# Patient Record
Sex: FEMALE | Race: WHITE | Employment: OTHER | URBAN - METROPOLITAN AREA
[De-identification: names, ages, dates, MRNs, and addresses within clinical notes are randomized per-mention and may not be internally consistent; named-entity substitution may affect disease eponyms.]

---

## 2021-08-19 ENCOUNTER — TRANSFERRED RECORDS (OUTPATIENT)
Dept: PHYSICAL THERAPY | Facility: CLINIC | Age: 71
End: 2021-08-19

## 2021-08-22 DIAGNOSIS — Z11.59 ENCOUNTER FOR SCREENING FOR OTHER VIRAL DISEASES: ICD-10-CM

## 2021-09-17 ENCOUNTER — HOSPITAL ENCOUNTER (OUTPATIENT)
Facility: CLINIC | Age: 71
Discharge: HOME | End: 2021-09-17
Attending: COLON & RECTAL SURGERY | Admitting: COLON & RECTAL SURGERY
Payer: MEDICARE

## 2021-09-17 VITALS
WEIGHT: 144 LBS | HEIGHT: 67 IN | DIASTOLIC BLOOD PRESSURE: 85 MMHG | HEART RATE: 95 BPM | BODY MASS INDEX: 22.6 KG/M2 | RESPIRATION RATE: 15 BRPM | SYSTOLIC BLOOD PRESSURE: 134 MMHG | TEMPERATURE: 97.2 F | OXYGEN SATURATION: 99 %

## 2021-09-17 LAB — COLONOSCOPY: NORMAL

## 2021-09-17 PROCEDURE — 99153 MOD SED SAME PHYS/QHP EA: CPT | Performed by: COLON & RECTAL SURGERY

## 2021-09-17 PROCEDURE — 250N000011 HC RX IP 250 OP 636: Performed by: COLON & RECTAL SURGERY

## 2021-09-17 PROCEDURE — 88305 TISSUE EXAM BY PATHOLOGIST: CPT | Mod: TC | Performed by: COLON & RECTAL SURGERY

## 2021-09-17 PROCEDURE — G0500 MOD SEDAT ENDO SERVICE >5YRS: HCPCS | Performed by: COLON & RECTAL SURGERY

## 2021-09-17 PROCEDURE — 45380 COLONOSCOPY AND BIOPSY: CPT | Mod: PT | Performed by: COLON & RECTAL SURGERY

## 2021-09-17 RX ORDER — ESOMEPRAZOLE MAGNESIUM 40 MG/1
40 CAPSULE, DELAYED RELEASE ORAL DAILY
COMMUNITY
End: 2021-11-16

## 2021-09-17 RX ORDER — BIOTIN 10000 MCG
10 CAPSULE ORAL DAILY
COMMUNITY

## 2021-09-17 RX ORDER — FOLIC ACID 1 MG/1
4 TABLET ORAL DAILY
COMMUNITY

## 2021-09-17 RX ORDER — ONDANSETRON 2 MG/ML
4 INJECTION INTRAMUSCULAR; INTRAVENOUS EVERY 6 HOURS PRN
Status: DISCONTINUED | OUTPATIENT
Start: 2021-09-17 | End: 2021-09-17 | Stop reason: HOSPADM

## 2021-09-17 RX ORDER — LISINOPRIL 5 MG/1
5 TABLET ORAL DAILY
COMMUNITY

## 2021-09-17 RX ORDER — FENTANYL CITRATE 50 UG/ML
INJECTION, SOLUTION INTRAMUSCULAR; INTRAVENOUS PRN
Status: COMPLETED | OUTPATIENT
Start: 2021-09-17 | End: 2021-09-17

## 2021-09-17 RX ORDER — LIDOCAINE 40 MG/G
CREAM TOPICAL
Status: DISCONTINUED | OUTPATIENT
Start: 2021-09-17 | End: 2021-09-17 | Stop reason: HOSPADM

## 2021-09-17 RX ORDER — NALOXONE HYDROCHLORIDE 0.4 MG/ML
0.2 INJECTION, SOLUTION INTRAMUSCULAR; INTRAVENOUS; SUBCUTANEOUS
Status: DISCONTINUED | OUTPATIENT
Start: 2021-09-17 | End: 2021-09-17 | Stop reason: HOSPADM

## 2021-09-17 RX ORDER — NALOXONE HYDROCHLORIDE 0.4 MG/ML
0.4 INJECTION, SOLUTION INTRAMUSCULAR; INTRAVENOUS; SUBCUTANEOUS
Status: DISCONTINUED | OUTPATIENT
Start: 2021-09-17 | End: 2021-09-17 | Stop reason: HOSPADM

## 2021-09-17 RX ORDER — ONDANSETRON 4 MG/1
4 TABLET, ORALLY DISINTEGRATING ORAL EVERY 6 HOURS PRN
Status: DISCONTINUED | OUTPATIENT
Start: 2021-09-17 | End: 2021-09-17 | Stop reason: HOSPADM

## 2021-09-17 RX ORDER — FLUMAZENIL 0.1 MG/ML
0.2 INJECTION, SOLUTION INTRAVENOUS
Status: DISCONTINUED | OUTPATIENT
Start: 2021-09-17 | End: 2021-09-17 | Stop reason: HOSPADM

## 2021-09-17 RX ORDER — LEVOTHYROXINE SODIUM 100 UG/1
100 TABLET ORAL DAILY
COMMUNITY

## 2021-09-17 RX ORDER — METHOTREXATE SODIUM 2.5 MG/1
15 TABLET ORAL WEEKLY
COMMUNITY

## 2021-09-17 RX ORDER — VERAPAMIL HYDROCHLORIDE 180 MG/1
180 TABLET, EXTENDED RELEASE ORAL
COMMUNITY
End: 2021-11-16

## 2021-09-17 RX ORDER — POTASSIUM CITRATE 15 MEQ/1
15 TABLET, EXTENDED RELEASE ORAL 2 TIMES DAILY
COMMUNITY
Start: 2021-07-08

## 2021-09-17 RX ORDER — ONDANSETRON 2 MG/ML
4 INJECTION INTRAMUSCULAR; INTRAVENOUS
Status: DISCONTINUED | OUTPATIENT
Start: 2021-09-17 | End: 2021-09-17 | Stop reason: HOSPADM

## 2021-09-17 RX ORDER — PROCHLORPERAZINE MALEATE 5 MG
5 TABLET ORAL EVERY 6 HOURS PRN
Status: DISCONTINUED | OUTPATIENT
Start: 2021-09-17 | End: 2021-09-17 | Stop reason: HOSPADM

## 2021-09-17 RX ORDER — VENLAFAXINE 37.5 MG/1
37.5 TABLET ORAL DAILY
COMMUNITY

## 2021-09-17 RX ORDER — PREDNISONE 2.5 MG/1
2.5 TABLET ORAL DAILY
COMMUNITY

## 2021-09-17 RX ADMIN — FENTANYL CITRATE 100 MCG: 50 INJECTION, SOLUTION INTRAMUSCULAR; INTRAVENOUS at 13:20

## 2021-09-17 RX ADMIN — FENTANYL CITRATE 50 MCG: 50 INJECTION, SOLUTION INTRAMUSCULAR; INTRAVENOUS at 13:33

## 2021-09-17 RX ADMIN — MIDAZOLAM 1 MG: 1 INJECTION INTRAMUSCULAR; INTRAVENOUS at 13:29

## 2021-09-17 RX ADMIN — MIDAZOLAM 1 MG: 1 INJECTION INTRAMUSCULAR; INTRAVENOUS at 13:21

## 2021-09-17 ASSESSMENT — MIFFLIN-ST. JEOR: SCORE: 1195.81

## 2021-09-17 NOTE — H&P
Pre-Endoscopy History and Physical     Ioana Solomon MRN# [id number]   YOB: 1950 Age: 70 year old     Date of Procedure: 9/17/2021  Primary care provider: Nena Banks  Type of Endoscopy: Colonoscopy  Reason for Procedure: Screening  Type of Anesthesia Anticipated: Moderate Sedation    HPI:    Ioana is a 70 year old female who will be undergoing the above procedure.      A history and physical has been performed. The patient's medications and allergies have been reviewed. The risks and benefits of the procedure and the sedation options and risks were discussed with the patient.  All questions were answered and informed consent was obtained.      She denies a personal or family history of anesthesia complications or bleeding disorders.     Allergies   Allergen Reactions     Levaquin [Levofloxacin]      Plaquenil [Hydroxychloroquine]         No current facility-administered medications on file prior to encounter.  Biotin 10 MG CAPS, Take 10 mg by mouth daily  cholecalciferol (VITAMIN D3) 25 mcg (1000 units) capsule, Take 1 capsule by mouth daily  folic acid (FOLVITE) 1 MG tablet, Take 4 mg by mouth daily  levothyroxine (SYNTHROID/LEVOTHROID) 100 MCG tablet, Take 100 mcg by mouth daily  lisinopril (ZESTRIL) 2.5 MG tablet, Take 2.5 mg by mouth daily  metFORMIN (GLUCOPHAGE) 1000 MG tablet, Take 1,000 mg by mouth 2 times daily (with meals)  methotrexate 2.5 MG tablet, Take 15 mg by mouth once a week  predniSONE (DELTASONE) 2.5 MG tablet, Take 2.5 mg by mouth daily  venlafaxine (EFFEXOR) 25 MG tablet, Take 25 mg by mouth 3 times daily  verapamil ER (CALAN-SR) 180 MG CR tablet, Take 180 mg by mouth 2 times daily        There is no problem list on file for this patient.       History reviewed. No pertinent past medical history.     Past Surgical History:   Procedure Laterality Date     ABDOMEN SURGERY      3 bladder surgeries     APPENDECTOMY       GYN SURGERY      c section x 2     HERNIA  "REPAIR         Social History     Tobacco Use     Smoking status: Never Smoker     Smokeless tobacco: Never Used   Substance Use Topics     Alcohol use: Not Currently       History reviewed. No pertinent family history.    REVIEW OF SYSTEMS:     5 point ROS negative except as noted above in HPI, including Gen., Resp., CV, GI &  system review.      PHYSICAL EXAM:   /65   Pulse 111   Temp 97.2  F (36.2  C) (Skin)   Resp 18   Ht 1.702 m (5' 7\")   Wt 65.3 kg (144 lb)   SpO2 97%   BMI 22.55 kg/m   Estimated body mass index is 22.55 kg/m  as calculated from the following:    Height as of this encounter: 1.702 m (5' 7\").    Weight as of this encounter: 65.3 kg (144 lb).   GENERAL APPEARANCE: healthy and alert  MENTAL STATUS: alert  AIRWAY EXAM: Mallampatti Class I (visualization of the soft palate, fauces, uvula, anterior and posterior pillars)  RESP: lungs clear to auscultation - no rales, rhonchi or wheezes  CV: regular rates and rhythm      IMPRESSION   ASA Class 2 - Mild systemic disease        PLAN:     Plan for colonoscopy. We discussed the risks, benefits and alternatives and the patient wished to proceed.    The above has been forwarded to the consulting provider.      Ceci Ordoñez MD  [facility]  Phone: [phone]  Fax: [phone]  September 17, 2021    "

## 2021-09-20 LAB
PATH REPORT.COMMENTS IMP SPEC: NORMAL
PATH REPORT.COMMENTS IMP SPEC: NORMAL
PATH REPORT.FINAL DX SPEC: NORMAL
PATH REPORT.GROSS SPEC: NORMAL
PATH REPORT.MICROSCOPIC SPEC OTHER STN: NORMAL
PHOTO IMAGE: NORMAL

## 2021-09-20 PROCEDURE — 88305 TISSUE EXAM BY PATHOLOGIST: CPT | Mod: 26 | Performed by: PATHOLOGY

## 2021-11-04 ENCOUNTER — TRANSFERRED RECORDS (OUTPATIENT)
Dept: MULTI SPECIALTY CLINIC | Facility: CLINIC | Age: 71
End: 2021-11-04
Payer: MEDICARE

## 2021-11-04 LAB
CREATININE (EXTERNAL): 0.86 MG/DL (ref 0.5–0.9)
GFR ESTIMATED (EXTERNAL): >60 ML/MIN/1.73M2
GLUCOSE (EXTERNAL): 183 MG/DL (ref 70–100)
POTASSIUM (EXTERNAL): 4.2 MMOL/L (ref 3.5–5.1)

## 2021-11-16 ENCOUNTER — ANESTHESIA EVENT (OUTPATIENT)
Dept: SURGERY | Facility: CLINIC | Age: 71
DRG: 330 | End: 2021-11-16
Payer: MEDICARE

## 2021-11-16 RX ORDER — CHOLECALCIFEROL (VITAMIN D3) 50 MCG
1 TABLET ORAL DAILY
COMMUNITY

## 2021-11-16 RX ORDER — FLUTICASONE PROPIONATE 50 MCG
2 SPRAY, SUSPENSION (ML) NASAL DAILY PRN
COMMUNITY

## 2021-11-16 RX ORDER — GABAPENTIN 300 MG/1
300 CAPSULE ORAL DAILY PRN
COMMUNITY

## 2021-11-16 RX ORDER — VALACYCLOVIR HYDROCHLORIDE 1 G/1
1000 TABLET, FILM COATED ORAL DAILY PRN
COMMUNITY

## 2021-11-16 RX ORDER — IBUPROFEN 800 MG/1
800 TABLET, FILM COATED ORAL 2 TIMES DAILY PRN
COMMUNITY

## 2021-11-16 RX ORDER — VERAPAMIL HYDROCHLORIDE 40 MG/1
40 TABLET ORAL 2 TIMES DAILY
COMMUNITY

## 2021-11-16 RX ORDER — ROSUVASTATIN CALCIUM 20 MG/1
20 TABLET, COATED ORAL DAILY
COMMUNITY

## 2021-11-16 ASSESSMENT — COPD QUESTIONNAIRES: COPD: 0

## 2021-11-16 ASSESSMENT — LIFESTYLE VARIABLES: TOBACCO_USE: 0

## 2021-11-16 NOTE — PROGRESS NOTES
PTA medications updated by Medication Scribe prior to surgery via phone call with patient (last doses completed by Nurse)     Medication history sources: Patient, Surescripts and H&P  In the past week, patient estimated taking medication this percent of the time: Greater than 90%  Adherence assessment: N/A Not Observed    Significant changes made to the medication list:  None      Additional medication history information:   None    Medication reconciliation completed by provider prior to medication history? No    Time spent in this activity: 45 MINUTES    The information provided in this note is only as accurate as the sources available at the time of update(s)      Prior to Admission medications    Medication Sig Last Dose Taking? Auth Provider   aspirin (ASA) 81 MG EC tablet Take 81 mg by mouth daily as needed   at PRN Yes Reported, Patient   Biotin 10 MG CAPS Take 10 mg by mouth daily  at PM Yes Reported, Patient   Certolizumab Pegol (CIMZIA SC) Inject 200 mg Subcutaneous every 14 days on Tuesdays  at AM Yes Reported, Patient   fluticasone (FLONASE) 50 MCG/ACT nasal spray Spray 2 sprays into both nostrils daily as needed for rhinitis or allergies  at PRN Yes Reported, Patient   folic acid (FOLVITE) 1 MG tablet Take 4 mg by mouth daily (1MG X 4 = 4MG)  at AM Yes Reported, Patient   gabapentin (NEURONTIN) 300 MG capsule Take 300 mg by mouth daily as needed  at PRN Yes Reported, Patient   ibuprofen (ADVIL/MOTRIN) 800 MG tablet Take 800 mg by mouth 2 times daily as needed for moderate pain  at PRN Yes Reported, Patient   levothyroxine (SYNTHROID/LEVOTHROID) 100 MCG tablet Take 100 mcg by mouth daily  at PM Yes Reported, Patient   lisinopril (ZESTRIL) 5 MG tablet Take 5 mg by mouth daily   at AM Yes Reported, Patient   metFORMIN (GLUCOPHAGE) 500 MG tablet Take 500 mg by mouth 2 times daily (with meals)   at PM Yes Reported, Patient   methotrexate 2.5 MG tablet Take 15 mg by mouth once a week on Thursdays  (2.5MG X 6  = 15MG)  at PM Yes Reported, Patient   omeprazole (PRILOSEC) 20 MG DR capsule Take 20 mg by mouth daily  at AM Yes Reported, Patient   potassium citrate 15 MEQ (1620 MG) TBCR Take 15 mEq by mouth 2 times daily  at PM Yes Reported, Patient   predniSONE (DELTASONE) 2.5 MG tablet Take 2.5 mg by mouth daily  at PM Yes Reported, Patient   rosuvastatin (CRESTOR) 20 MG tablet Take 20 mg by mouth daily  at PM Yes Reported, Patient   UNABLE TO FIND Apply topically three times a week on to scalp  MEDICATION NAME: Compound Medication Cream  at PRN Yes Reported, Patient   valACYclovir (VALTREX) 1000 mg tablet Take 1,000 mg by mouth daily as needed (COLD SORES)  at PRN Yes Reported, Patient   venlafaxine (EFFEXOR) 37.5 MG tablet Take 37.5 mg by mouth daily   at PM Yes Reported, Patient   verapamil (CALAN) 40 MG tablet Take 40 mg by mouth 2 times daily  at AM Yes Reported, Patient   vitamin D3 (CHOLECALCIFEROL) 50 mcg (2000 units) tablet Take 1 tablet by mouth daily  at AM Yes Reported, Patient

## 2021-11-17 ENCOUNTER — ANESTHESIA (OUTPATIENT)
Dept: SURGERY | Facility: CLINIC | Age: 71
DRG: 330 | End: 2021-11-17
Payer: MEDICARE

## 2021-11-17 ENCOUNTER — HOSPITAL ENCOUNTER (INPATIENT)
Facility: CLINIC | Age: 71
LOS: 2 days | Discharge: HOME | DRG: 330 | End: 2021-11-19
Attending: COLON & RECTAL SURGERY | Admitting: COLON & RECTAL SURGERY
Payer: MEDICARE

## 2021-11-17 ENCOUNTER — APPOINTMENT (OUTPATIENT)
Dept: CT IMAGING | Facility: CLINIC | Age: 71
DRG: 330 | End: 2021-11-17
Attending: NURSE PRACTITIONER
Payer: MEDICARE

## 2021-11-17 DIAGNOSIS — K62.3 RECTAL PROLAPSE: Primary | ICD-10-CM

## 2021-11-17 LAB
ABO/RH(D): NORMAL
ALBUMIN SERPL-MCNC: 3.1 G/DL (ref 3.4–5)
ALP SERPL-CCNC: 78 U/L (ref 40–150)
ALT SERPL W P-5'-P-CCNC: 27 U/L (ref 0–50)
ANION GAP SERPL CALCULATED.3IONS-SCNC: 13 MMOL/L (ref 3–14)
ANION GAP SERPL CALCULATED.3IONS-SCNC: 14 MMOL/L (ref 3–14)
ANTIBODY SCREEN: NEGATIVE
AST SERPL W P-5'-P-CCNC: 24 U/L (ref 0–45)
ATRIAL RATE - MUSE: 94 BPM
BILIRUB DIRECT SERPL-MCNC: <0.1 MG/DL (ref 0–0.2)
BILIRUB SERPL-MCNC: 0.3 MG/DL (ref 0.2–1.3)
BUN SERPL-MCNC: 15 MG/DL (ref 7–30)
BUN SERPL-MCNC: 16 MG/DL (ref 7–30)
CALCIUM SERPL-MCNC: 8.3 MG/DL (ref 8.5–10.1)
CALCIUM SERPL-MCNC: 8.8 MG/DL (ref 8.5–10.1)
CHLORIDE BLD-SCNC: 108 MMOL/L (ref 94–109)
CHLORIDE BLD-SCNC: 108 MMOL/L (ref 94–109)
CO2 SERPL-SCNC: 17 MMOL/L (ref 20–32)
CO2 SERPL-SCNC: 18 MMOL/L (ref 20–32)
COLONOSCOPY: NORMAL
CREAT SERPL-MCNC: 0.86 MG/DL (ref 0.52–1.04)
CREAT SERPL-MCNC: 0.99 MG/DL (ref 0.52–1.04)
CREAT SERPL-MCNC: 1.02 MG/DL (ref 0.52–1.04)
DIASTOLIC BLOOD PRESSURE - MUSE: NORMAL MMHG
ERYTHROCYTE [DISTWIDTH] IN BLOOD BY AUTOMATED COUNT: 17.1 % (ref 10–15)
GFR SERPL CREATININE-BSD FRML MDRD: 55 ML/MIN/1.73M2
GFR SERPL CREATININE-BSD FRML MDRD: 57 ML/MIN/1.73M2
GFR SERPL CREATININE-BSD FRML MDRD: 68 ML/MIN/1.73M2
GLUCOSE BLD-MCNC: 110 MG/DL (ref 70–99)
GLUCOSE BLD-MCNC: 141 MG/DL (ref 70–99)
GLUCOSE BLDC GLUCOMTR-MCNC: 119 MG/DL (ref 70–99)
GLUCOSE BLDC GLUCOMTR-MCNC: 128 MG/DL (ref 70–99)
GLUCOSE BLDC GLUCOMTR-MCNC: 129 MG/DL (ref 70–99)
GLUCOSE BLDC GLUCOMTR-MCNC: 93 MG/DL (ref 70–99)
HCT VFR BLD AUTO: 29.3 % (ref 35–47)
HGB BLD-MCNC: 9.3 G/DL (ref 11.7–15.7)
HOLD SPECIMEN: NORMAL
INTERPRETATION ECG - MUSE: NORMAL
LIPASE SERPL-CCNC: 249 U/L (ref 73–393)
MCH RBC QN AUTO: 26.6 PG (ref 26.5–33)
MCHC RBC AUTO-ENTMCNC: 31.7 G/DL (ref 31.5–36.5)
MCV RBC AUTO: 84 FL (ref 78–100)
P AXIS - MUSE: 62 DEGREES
PLATELET # BLD AUTO: 249 10E3/UL (ref 150–450)
POTASSIUM BLD-SCNC: 3.4 MMOL/L (ref 3.4–5.3)
POTASSIUM BLD-SCNC: 3.4 MMOL/L (ref 3.4–5.3)
POTASSIUM BLD-SCNC: 3.9 MMOL/L (ref 3.4–5.3)
PR INTERVAL - MUSE: 140 MS
PROT SERPL-MCNC: 6 G/DL (ref 6.8–8.8)
QRS DURATION - MUSE: 82 MS
QT - MUSE: 386 MS
QTC - MUSE: 482 MS
R AXIS - MUSE: -15 DEGREES
RBC # BLD AUTO: 3.5 10E6/UL (ref 3.8–5.2)
SODIUM SERPL-SCNC: 139 MMOL/L (ref 133–144)
SODIUM SERPL-SCNC: 139 MMOL/L (ref 133–144)
SPECIMEN EXPIRATION DATE: NORMAL
SYSTOLIC BLOOD PRESSURE - MUSE: NORMAL MMHG
T AXIS - MUSE: 102 DEGREES
TROPONIN I SERPL-MCNC: <0.015 UG/L (ref 0–0.04)
TROPONIN I SERPL-MCNC: <0.015 UG/L (ref 0–0.04)
VENTRICULAR RATE- MUSE: 94 BPM
WBC # BLD AUTO: 10 10E3/UL (ref 4–11)

## 2021-11-17 PROCEDURE — 93005 ELECTROCARDIOGRAM TRACING: CPT

## 2021-11-17 PROCEDURE — 0DBN8ZX EXCISION OF SIGMOID COLON, VIA NATURAL OR ARTIFICIAL OPENING ENDOSCOPIC, DIAGNOSTIC: ICD-10-PCS | Performed by: OBSTETRICS & GYNECOLOGY

## 2021-11-17 PROCEDURE — 83690 ASSAY OF LIPASE: CPT | Performed by: NURSE PRACTITIONER

## 2021-11-17 PROCEDURE — 370N000017 HC ANESTHESIA TECHNICAL FEE, PER MIN: Performed by: COLON & RECTAL SURGERY

## 2021-11-17 PROCEDURE — 250N000011 HC RX IP 250 OP 636: Performed by: NURSE PRACTITIONER

## 2021-11-17 PROCEDURE — 36415 COLL VENOUS BLD VENIPUNCTURE: CPT | Performed by: ANESTHESIOLOGY

## 2021-11-17 PROCEDURE — 999N000141 HC STATISTIC PRE-PROCEDURE NURSING ASSESSMENT: Performed by: COLON & RECTAL SURGERY

## 2021-11-17 PROCEDURE — 258N000003 HC RX IP 258 OP 636: Performed by: COLON & RECTAL SURGERY

## 2021-11-17 PROCEDURE — 36415 COLL VENOUS BLD VENIPUNCTURE: CPT | Performed by: NURSE PRACTITIONER

## 2021-11-17 PROCEDURE — 82040 ASSAY OF SERUM ALBUMIN: CPT | Performed by: NURSE PRACTITIONER

## 2021-11-17 PROCEDURE — 250N000025 HC SEVOFLURANE, PER MIN: Performed by: COLON & RECTAL SURGERY

## 2021-11-17 PROCEDURE — 710N000009 HC RECOVERY PHASE 1, LEVEL 1, PER MIN: Performed by: COLON & RECTAL SURGERY

## 2021-11-17 PROCEDURE — 99207 PR CDG-CODE CATEGORY CHANGED: CPT | Performed by: PHYSICIAN ASSISTANT

## 2021-11-17 PROCEDURE — 258N000003 HC RX IP 258 OP 636: Performed by: ANESTHESIOLOGY

## 2021-11-17 PROCEDURE — 999N000157 HC STATISTIC RCP TIME EA 10 MIN

## 2021-11-17 PROCEDURE — 250N000011 HC RX IP 250 OP 636: Performed by: ANESTHESIOLOGY

## 2021-11-17 PROCEDURE — 84132 ASSAY OF SERUM POTASSIUM: CPT | Performed by: ANESTHESIOLOGY

## 2021-11-17 PROCEDURE — 0DNW4ZZ RELEASE PERITONEUM, PERCUTANEOUS ENDOSCOPIC APPROACH: ICD-10-PCS | Performed by: OBSTETRICS & GYNECOLOGY

## 2021-11-17 PROCEDURE — 250N000009 HC RX 250: Performed by: ANESTHESIOLOGY

## 2021-11-17 PROCEDURE — 84484 ASSAY OF TROPONIN QUANT: CPT | Performed by: NURSE PRACTITIONER

## 2021-11-17 PROCEDURE — 250N000009 HC RX 250: Performed by: COLON & RECTAL SURGERY

## 2021-11-17 PROCEDURE — 250N000013 HC RX MED GY IP 250 OP 250 PS 637: Performed by: COLON & RECTAL SURGERY

## 2021-11-17 PROCEDURE — 71275 CT ANGIOGRAPHY CHEST: CPT

## 2021-11-17 PROCEDURE — 250N000011 HC RX IP 250 OP 636: Performed by: COLON & RECTAL SURGERY

## 2021-11-17 PROCEDURE — 250N000009 HC RX 250: Performed by: PHYSICIAN ASSISTANT

## 2021-11-17 PROCEDURE — 120N000001 HC R&B MED SURG/OB

## 2021-11-17 PROCEDURE — 0DQP4ZZ REPAIR RECTUM, PERCUTANEOUS ENDOSCOPIC APPROACH: ICD-10-PCS | Performed by: OBSTETRICS & GYNECOLOGY

## 2021-11-17 PROCEDURE — 258N000001 HC RX 258: Performed by: COLON & RECTAL SURGERY

## 2021-11-17 PROCEDURE — 99207 PR APP CREDIT; MD BILLING SHARED VISIT: CPT | Performed by: NURSE PRACTITIONER

## 2021-11-17 PROCEDURE — 250N000013 HC RX MED GY IP 250 OP 250 PS 637: Performed by: PHYSICIAN ASSISTANT

## 2021-11-17 PROCEDURE — 250N000012 HC RX MED GY IP 250 OP 636 PS 637: Performed by: ANESTHESIOLOGY

## 2021-11-17 PROCEDURE — 8E0W4CZ ROBOTIC ASSISTED PROCEDURE OF TRUNK REGION, PERCUTANEOUS ENDOSCOPIC APPROACH: ICD-10-PCS | Performed by: OBSTETRICS & GYNECOLOGY

## 2021-11-17 PROCEDURE — 82565 ASSAY OF CREATININE: CPT | Performed by: COLON & RECTAL SURGERY

## 2021-11-17 PROCEDURE — 360N000080 HC SURGERY LEVEL 7, PER MIN: Performed by: COLON & RECTAL SURGERY

## 2021-11-17 PROCEDURE — 250N000009 HC RX 250: Performed by: NURSE PRACTITIONER

## 2021-11-17 PROCEDURE — 88305 TISSUE EXAM BY PATHOLOGIST: CPT | Mod: TC | Performed by: COLON & RECTAL SURGERY

## 2021-11-17 PROCEDURE — 99233 SBSQ HOSP IP/OBS HIGH 50: CPT | Performed by: PHYSICIAN ASSISTANT

## 2021-11-17 PROCEDURE — 80048 BASIC METABOLIC PNL TOTAL CA: CPT | Performed by: NURSE PRACTITIONER

## 2021-11-17 PROCEDURE — 250N000011 HC RX IP 250 OP 636: Performed by: NURSE ANESTHETIST, CERTIFIED REGISTERED

## 2021-11-17 PROCEDURE — 85027 COMPLETE CBC AUTOMATED: CPT | Performed by: NURSE PRACTITIONER

## 2021-11-17 PROCEDURE — 86900 BLOOD TYPING SEROLOGIC ABO: CPT | Performed by: ANESTHESIOLOGY

## 2021-11-17 PROCEDURE — 272N000001 HC OR GENERAL SUPPLY STERILE: Performed by: COLON & RECTAL SURGERY

## 2021-11-17 RX ORDER — APREPITANT 40 MG/1
40 CAPSULE ORAL ONCE
Status: COMPLETED | OUTPATIENT
Start: 2021-11-17 | End: 2021-11-17

## 2021-11-17 RX ORDER — MEPERIDINE HYDROCHLORIDE 25 MG/ML
12.5 INJECTION INTRAMUSCULAR; INTRAVENOUS; SUBCUTANEOUS
Status: DISCONTINUED | OUTPATIENT
Start: 2021-11-17 | End: 2021-11-17 | Stop reason: HOSPADM

## 2021-11-17 RX ORDER — ONDANSETRON 4 MG/1
4 TABLET, ORALLY DISINTEGRATING ORAL EVERY 30 MIN PRN
Status: DISCONTINUED | OUTPATIENT
Start: 2021-11-17 | End: 2021-11-17 | Stop reason: HOSPADM

## 2021-11-17 RX ORDER — BIOTIN 10000 MCG
10 CAPSULE ORAL DAILY
Status: DISCONTINUED | OUTPATIENT
Start: 2021-11-17 | End: 2021-11-19 | Stop reason: HOSPADM

## 2021-11-17 RX ORDER — CEFAZOLIN SODIUM 2 G/100ML
2 INJECTION, SOLUTION INTRAVENOUS SEE ADMIN INSTRUCTIONS
Status: DISCONTINUED | OUTPATIENT
Start: 2021-11-17 | End: 2021-11-17

## 2021-11-17 RX ORDER — ROSUVASTATIN CALCIUM 20 MG/1
20 TABLET, COATED ORAL DAILY
Status: DISCONTINUED | OUTPATIENT
Start: 2021-11-17 | End: 2021-11-19 | Stop reason: HOSPADM

## 2021-11-17 RX ORDER — HYDROMORPHONE HCL IN WATER/PF 6 MG/30 ML
0.2 PATIENT CONTROLLED ANALGESIA SYRINGE INTRAVENOUS EVERY 5 MIN PRN
Status: DISCONTINUED | OUTPATIENT
Start: 2021-11-17 | End: 2021-11-17 | Stop reason: HOSPADM

## 2021-11-17 RX ORDER — LISINOPRIL 5 MG/1
5 TABLET ORAL DAILY
Status: DISCONTINUED | OUTPATIENT
Start: 2021-11-17 | End: 2021-11-19 | Stop reason: HOSPADM

## 2021-11-17 RX ORDER — NEOSTIGMINE METHYLSULFATE 1 MG/ML
VIAL (ML) INJECTION PRN
Status: DISCONTINUED | OUTPATIENT
Start: 2021-11-17 | End: 2021-11-17

## 2021-11-17 RX ORDER — NALOXONE HYDROCHLORIDE 0.4 MG/ML
0.2 INJECTION, SOLUTION INTRAMUSCULAR; INTRAVENOUS; SUBCUTANEOUS
Status: DISCONTINUED | OUTPATIENT
Start: 2021-11-17 | End: 2021-11-19 | Stop reason: HOSPADM

## 2021-11-17 RX ORDER — FENTANYL CITRATE 50 UG/ML
INJECTION, SOLUTION INTRAMUSCULAR; INTRAVENOUS PRN
Status: DISCONTINUED | OUTPATIENT
Start: 2021-11-17 | End: 2021-11-17

## 2021-11-17 RX ORDER — DIPHENHYDRAMINE HYDROCHLORIDE 50 MG/ML
12.5 INJECTION INTRAMUSCULAR; INTRAVENOUS EVERY 6 HOURS PRN
Status: DISCONTINUED | OUTPATIENT
Start: 2021-11-17 | End: 2021-11-17 | Stop reason: HOSPADM

## 2021-11-17 RX ORDER — FENTANYL CITRATE 0.05 MG/ML
25 INJECTION, SOLUTION INTRAMUSCULAR; INTRAVENOUS
Status: DISCONTINUED | OUTPATIENT
Start: 2021-11-17 | End: 2021-11-17 | Stop reason: HOSPADM

## 2021-11-17 RX ORDER — HYDROMORPHONE HCL IN WATER/PF 6 MG/30 ML
0.4 PATIENT CONTROLLED ANALGESIA SYRINGE INTRAVENOUS
Status: DISCONTINUED | OUTPATIENT
Start: 2021-11-17 | End: 2021-11-19 | Stop reason: HOSPADM

## 2021-11-17 RX ORDER — DIPHENHYDRAMINE HYDROCHLORIDE 50 MG/ML
INJECTION INTRAMUSCULAR; INTRAVENOUS PRN
Status: DISCONTINUED | OUTPATIENT
Start: 2021-11-17 | End: 2021-11-17

## 2021-11-17 RX ORDER — EPHEDRINE SULFATE 50 MG/ML
INJECTION, SOLUTION INTRAMUSCULAR; INTRAVENOUS; SUBCUTANEOUS PRN
Status: DISCONTINUED | OUTPATIENT
Start: 2021-11-17 | End: 2021-11-17

## 2021-11-17 RX ORDER — SODIUM CHLORIDE, SODIUM LACTATE, POTASSIUM CHLORIDE, CALCIUM CHLORIDE 600; 310; 30; 20 MG/100ML; MG/100ML; MG/100ML; MG/100ML
INJECTION, SOLUTION INTRAVENOUS CONTINUOUS
Status: DISCONTINUED | OUTPATIENT
Start: 2021-11-17 | End: 2021-11-17 | Stop reason: HOSPADM

## 2021-11-17 RX ORDER — CEFAZOLIN SODIUM 1 G/3ML
1 INJECTION, POWDER, FOR SOLUTION INTRAMUSCULAR; INTRAVENOUS EVERY 8 HOURS
Status: DISCONTINUED | OUTPATIENT
Start: 2021-11-17 | End: 2021-11-17

## 2021-11-17 RX ORDER — LIDOCAINE 40 MG/G
CREAM TOPICAL
Status: DISCONTINUED | OUTPATIENT
Start: 2021-11-17 | End: 2021-11-19 | Stop reason: HOSPADM

## 2021-11-17 RX ORDER — FENTANYL CITRATE 0.05 MG/ML
50 INJECTION, SOLUTION INTRAMUSCULAR; INTRAVENOUS
Status: DISCONTINUED | OUTPATIENT
Start: 2021-11-17 | End: 2021-11-17 | Stop reason: HOSPADM

## 2021-11-17 RX ORDER — ACETAMINOPHEN 325 MG/1
975 TABLET ORAL ONCE
Status: COMPLETED | OUTPATIENT
Start: 2021-11-17 | End: 2021-11-17

## 2021-11-17 RX ORDER — CEFAZOLIN SODIUM 2 G/100ML
2 INJECTION, SOLUTION INTRAVENOUS
Status: DISCONTINUED | OUTPATIENT
Start: 2021-11-17 | End: 2021-11-17

## 2021-11-17 RX ORDER — ONDANSETRON 4 MG/1
4 TABLET, ORALLY DISINTEGRATING ORAL EVERY 6 HOURS PRN
Status: DISCONTINUED | OUTPATIENT
Start: 2021-11-17 | End: 2021-11-19 | Stop reason: HOSPADM

## 2021-11-17 RX ORDER — SODIUM CHLORIDE, SODIUM LACTATE, POTASSIUM CHLORIDE, CALCIUM CHLORIDE 600; 310; 30; 20 MG/100ML; MG/100ML; MG/100ML; MG/100ML
INJECTION, SOLUTION INTRAVENOUS CONTINUOUS
Status: DISCONTINUED | OUTPATIENT
Start: 2021-11-17 | End: 2021-11-19 | Stop reason: HOSPADM

## 2021-11-17 RX ORDER — GABAPENTIN 300 MG/1
300 CAPSULE ORAL DAILY PRN
Status: DISCONTINUED | OUTPATIENT
Start: 2021-11-17 | End: 2021-11-19 | Stop reason: HOSPADM

## 2021-11-17 RX ORDER — NICOTINE POLACRILEX 4 MG
15-30 LOZENGE BUCCAL
Status: DISCONTINUED | OUTPATIENT
Start: 2021-11-17 | End: 2021-11-19 | Stop reason: HOSPADM

## 2021-11-17 RX ORDER — FLUTICASONE PROPIONATE 50 MCG
2 SPRAY, SUSPENSION (ML) NASAL DAILY PRN
Status: DISCONTINUED | OUTPATIENT
Start: 2021-11-17 | End: 2021-11-19 | Stop reason: HOSPADM

## 2021-11-17 RX ORDER — FENTANYL CITRATE 0.05 MG/ML
25 INJECTION, SOLUTION INTRAMUSCULAR; INTRAVENOUS EVERY 5 MIN PRN
Status: DISCONTINUED | OUTPATIENT
Start: 2021-11-17 | End: 2021-11-17 | Stop reason: HOSPADM

## 2021-11-17 RX ORDER — VERAPAMIL HYDROCHLORIDE 40 MG/1
40 TABLET ORAL 2 TIMES DAILY
Status: DISCONTINUED | OUTPATIENT
Start: 2021-11-17 | End: 2021-11-19 | Stop reason: HOSPADM

## 2021-11-17 RX ORDER — NALOXONE HYDROCHLORIDE 0.4 MG/ML
0.4 INJECTION, SOLUTION INTRAMUSCULAR; INTRAVENOUS; SUBCUTANEOUS
Status: DISCONTINUED | OUTPATIENT
Start: 2021-11-17 | End: 2021-11-19 | Stop reason: HOSPADM

## 2021-11-17 RX ORDER — IOPAMIDOL 755 MG/ML
58 INJECTION, SOLUTION INTRAVASCULAR ONCE
Status: COMPLETED | OUTPATIENT
Start: 2021-11-17 | End: 2021-11-17

## 2021-11-17 RX ORDER — BUPIVACAINE HYDROCHLORIDE AND EPINEPHRINE 5; 5 MG/ML; UG/ML
INJECTION, SOLUTION PERINEURAL PRN
Status: DISCONTINUED | OUTPATIENT
Start: 2021-11-17 | End: 2021-11-17 | Stop reason: HOSPADM

## 2021-11-17 RX ORDER — VENLAFAXINE 37.5 MG/1
37.5 TABLET ORAL DAILY
Status: DISCONTINUED | OUTPATIENT
Start: 2021-11-17 | End: 2021-11-19 | Stop reason: HOSPADM

## 2021-11-17 RX ORDER — CEFAZOLIN SODIUM 2 G/100ML
INJECTION, SOLUTION INTRAVENOUS PRN
Status: DISCONTINUED | OUTPATIENT
Start: 2021-11-17 | End: 2021-11-17

## 2021-11-17 RX ORDER — LEVOTHYROXINE SODIUM 50 UG/1
100 TABLET ORAL DAILY
Status: DISCONTINUED | OUTPATIENT
Start: 2021-11-17 | End: 2021-11-19 | Stop reason: HOSPADM

## 2021-11-17 RX ORDER — ONDANSETRON 2 MG/ML
4 INJECTION INTRAMUSCULAR; INTRAVENOUS EVERY 30 MIN PRN
Status: DISCONTINUED | OUTPATIENT
Start: 2021-11-17 | End: 2021-11-17 | Stop reason: HOSPADM

## 2021-11-17 RX ORDER — MAGNESIUM HYDROXIDE 1200 MG/15ML
LIQUID ORAL PRN
Status: DISCONTINUED | OUTPATIENT
Start: 2021-11-17 | End: 2021-11-17 | Stop reason: HOSPADM

## 2021-11-17 RX ORDER — DIPHENHYDRAMINE HYDROCHLORIDE 50 MG/ML
12.5 INJECTION INTRAMUSCULAR; INTRAVENOUS EVERY 6 HOURS PRN
Status: DISCONTINUED | OUTPATIENT
Start: 2021-11-17 | End: 2021-11-19 | Stop reason: HOSPADM

## 2021-11-17 RX ORDER — GLYCOPYRROLATE 0.2 MG/ML
INJECTION, SOLUTION INTRAMUSCULAR; INTRAVENOUS PRN
Status: DISCONTINUED | OUTPATIENT
Start: 2021-11-17 | End: 2021-11-17

## 2021-11-17 RX ORDER — LIDOCAINE HYDROCHLORIDE 20 MG/ML
INJECTION, SOLUTION INFILTRATION; PERINEURAL PRN
Status: DISCONTINUED | OUTPATIENT
Start: 2021-11-17 | End: 2021-11-17

## 2021-11-17 RX ORDER — CIPROFLOXACIN 2 MG/ML
400 INJECTION, SOLUTION INTRAVENOUS ONCE
Status: CANCELLED | OUTPATIENT
Start: 2021-11-17

## 2021-11-17 RX ORDER — FOLIC ACID 1 MG/1
4 TABLET ORAL DAILY
Status: DISCONTINUED | OUTPATIENT
Start: 2021-11-18 | End: 2021-11-19 | Stop reason: HOSPADM

## 2021-11-17 RX ORDER — ONDANSETRON 2 MG/ML
4 INJECTION INTRAMUSCULAR; INTRAVENOUS ONCE
Status: COMPLETED | OUTPATIENT
Start: 2021-11-17 | End: 2021-11-17

## 2021-11-17 RX ORDER — ACETAMINOPHEN 325 MG/1
975 TABLET ORAL EVERY 8 HOURS
Status: DISCONTINUED | OUTPATIENT
Start: 2021-11-17 | End: 2021-11-19 | Stop reason: HOSPADM

## 2021-11-17 RX ORDER — DEXTROSE MONOHYDRATE 25 G/50ML
25-50 INJECTION, SOLUTION INTRAVENOUS
Status: DISCONTINUED | OUTPATIENT
Start: 2021-11-17 | End: 2021-11-19 | Stop reason: HOSPADM

## 2021-11-17 RX ORDER — DEXAMETHASONE SODIUM PHOSPHATE 4 MG/ML
INJECTION, SOLUTION INTRA-ARTICULAR; INTRALESIONAL; INTRAMUSCULAR; INTRAVENOUS; SOFT TISSUE PRN
Status: DISCONTINUED | OUTPATIENT
Start: 2021-11-17 | End: 2021-11-17

## 2021-11-17 RX ORDER — OXYCODONE HYDROCHLORIDE 5 MG/1
10 TABLET ORAL EVERY 4 HOURS PRN
Status: DISCONTINUED | OUTPATIENT
Start: 2021-11-17 | End: 2021-11-19 | Stop reason: HOSPADM

## 2021-11-17 RX ORDER — PROPOFOL 10 MG/ML
INJECTION, EMULSION INTRAVENOUS PRN
Status: DISCONTINUED | OUTPATIENT
Start: 2021-11-17 | End: 2021-11-17

## 2021-11-17 RX ORDER — HYDROMORPHONE HCL IN WATER/PF 6 MG/30 ML
0.2 PATIENT CONTROLLED ANALGESIA SYRINGE INTRAVENOUS
Status: DISCONTINUED | OUTPATIENT
Start: 2021-11-17 | End: 2021-11-19 | Stop reason: HOSPADM

## 2021-11-17 RX ORDER — ONDANSETRON 2 MG/ML
4 INJECTION INTRAMUSCULAR; INTRAVENOUS EVERY 6 HOURS PRN
Status: DISCONTINUED | OUTPATIENT
Start: 2021-11-17 | End: 2021-11-19 | Stop reason: HOSPADM

## 2021-11-17 RX ORDER — DIPHENHYDRAMINE HCL 12.5MG/5ML
12.5 LIQUID (ML) ORAL EVERY 6 HOURS PRN
Status: DISCONTINUED | OUTPATIENT
Start: 2021-11-17 | End: 2021-11-17 | Stop reason: HOSPADM

## 2021-11-17 RX ORDER — OXYCODONE HYDROCHLORIDE 5 MG/1
5 TABLET ORAL EVERY 4 HOURS PRN
Status: DISCONTINUED | OUTPATIENT
Start: 2021-11-17 | End: 2021-11-19 | Stop reason: HOSPADM

## 2021-11-17 RX ORDER — ACETAMINOPHEN 325 MG/1
650 TABLET ORAL EVERY 4 HOURS PRN
Status: DISCONTINUED | OUTPATIENT
Start: 2021-11-20 | End: 2021-11-19 | Stop reason: HOSPADM

## 2021-11-17 RX ADMIN — LIDOCAINE HYDROCHLORIDE 100 MG: 20 INJECTION, SOLUTION INFILTRATION; PERINEURAL at 07:47

## 2021-11-17 RX ADMIN — ROCURONIUM BROMIDE 50 MG: 50 INJECTION, SOLUTION INTRAVENOUS at 07:47

## 2021-11-17 RX ADMIN — VERAPAMIL HYDROCHLORIDE 40 MG: 40 TABLET, FILM COATED ORAL at 21:16

## 2021-11-17 RX ADMIN — METRONIDAZOLE 500 MG: 500 INJECTION, SOLUTION INTRAVENOUS at 06:59

## 2021-11-17 RX ADMIN — Medication 5 MG: at 11:26

## 2021-11-17 RX ADMIN — LIDOCAINE HYDROCHLORIDE 30 ML: 20 SOLUTION ORAL; TOPICAL at 18:02

## 2021-11-17 RX ADMIN — IOPAMIDOL 58 ML: 755 INJECTION, SOLUTION INTRAVENOUS at 16:42

## 2021-11-17 RX ADMIN — PHENYLEPHRINE HYDROCHLORIDE 100 MCG: 10 INJECTION INTRAVENOUS at 11:26

## 2021-11-17 RX ADMIN — PHENYLEPHRINE HYDROCHLORIDE 100 MCG: 10 INJECTION INTRAVENOUS at 09:13

## 2021-11-17 RX ADMIN — PHENYLEPHRINE HYDROCHLORIDE 100 MCG: 10 INJECTION INTRAVENOUS at 11:22

## 2021-11-17 RX ADMIN — GLYCOPYRROLATE 0.4 MG: 0.2 INJECTION, SOLUTION INTRAMUSCULAR; INTRAVENOUS at 11:36

## 2021-11-17 RX ADMIN — FENTANYL CITRATE 100 MCG: 50 INJECTION, SOLUTION INTRAMUSCULAR; INTRAVENOUS at 07:47

## 2021-11-17 RX ADMIN — PHENYLEPHRINE HYDROCHLORIDE 100 MCG: 10 INJECTION INTRAVENOUS at 11:27

## 2021-11-17 RX ADMIN — LEVOTHYROXINE SODIUM 100 MCG: 50 TABLET ORAL at 21:16

## 2021-11-17 RX ADMIN — ROSUVASTATIN CALCIUM 20 MG: 20 TABLET, FILM COATED ORAL at 21:16

## 2021-11-17 RX ADMIN — DIPHENHYDRAMINE HYDROCHLORIDE 12.5 MG: 50 INJECTION, SOLUTION INTRAMUSCULAR; INTRAVENOUS at 07:53

## 2021-11-17 RX ADMIN — DEXAMETHASONE SODIUM PHOSPHATE 8 MG: 4 INJECTION, SOLUTION INTRA-ARTICULAR; INTRALESIONAL; INTRAMUSCULAR; INTRAVENOUS; SOFT TISSUE at 07:53

## 2021-11-17 RX ADMIN — NEOSTIGMINE METHYLSULFATE 3 MG: 1 INJECTION, SOLUTION INTRAVENOUS at 11:36

## 2021-11-17 RX ADMIN — CEFAZOLIN SODIUM 2 G: 2 INJECTION, SOLUTION INTRAVENOUS at 08:04

## 2021-11-17 RX ADMIN — METRONIDAZOLE 500 MG: 500 INJECTION, SOLUTION INTRAVENOUS at 17:52

## 2021-11-17 RX ADMIN — APREPITANT 40 MG: 40 CAPSULE ORAL at 07:11

## 2021-11-17 RX ADMIN — SODIUM CHLORIDE, POTASSIUM CHLORIDE, SODIUM LACTATE AND CALCIUM CHLORIDE: 600; 310; 30; 20 INJECTION, SOLUTION INTRAVENOUS at 07:31

## 2021-11-17 RX ADMIN — ACETAMINOPHEN 975 MG: 325 TABLET, FILM COATED ORAL at 23:22

## 2021-11-17 RX ADMIN — ACETAMINOPHEN 975 MG: 325 TABLET, FILM COATED ORAL at 07:09

## 2021-11-17 RX ADMIN — SODIUM CHLORIDE, POTASSIUM CHLORIDE, SODIUM LACTATE AND CALCIUM CHLORIDE: 600; 310; 30; 20 INJECTION, SOLUTION INTRAVENOUS at 09:15

## 2021-11-17 RX ADMIN — PROPOFOL 150 MG: 10 INJECTION, EMULSION INTRAVENOUS at 07:47

## 2021-11-17 RX ADMIN — OMEPRAZOLE 20 MG: 20 CAPSULE, DELAYED RELEASE ORAL at 16:15

## 2021-11-17 RX ADMIN — ROCURONIUM BROMIDE 10 MG: 50 INJECTION, SOLUTION INTRAVENOUS at 08:59

## 2021-11-17 RX ADMIN — ONDANSETRON 4 MG: 2 INJECTION INTRAMUSCULAR; INTRAVENOUS at 07:40

## 2021-11-17 RX ADMIN — SODIUM CHLORIDE 84 ML: 900 INJECTION INTRAVENOUS at 16:42

## 2021-11-17 RX ADMIN — HYDROMORPHONE HYDROCHLORIDE 0.25 MG: 1 INJECTION, SOLUTION INTRAMUSCULAR; INTRAVENOUS; SUBCUTANEOUS at 11:31

## 2021-11-17 RX ADMIN — FAMOTIDINE 20 MG: 10 INJECTION, SOLUTION INTRAVENOUS at 18:22

## 2021-11-17 RX ADMIN — ACETAMINOPHEN 975 MG: 325 TABLET, FILM COATED ORAL at 16:14

## 2021-11-17 RX ADMIN — SODIUM CHLORIDE, POTASSIUM CHLORIDE, SODIUM LACTATE AND CALCIUM CHLORIDE: 600; 310; 30; 20 INJECTION, SOLUTION INTRAVENOUS at 15:24

## 2021-11-17 RX ADMIN — VENLAFAXINE 37.5 MG: 37.5 TABLET ORAL at 21:16

## 2021-11-17 RX ADMIN — Medication 5 MG: at 11:25

## 2021-11-17 RX ADMIN — Medication 5 MG: at 09:43

## 2021-11-17 RX ADMIN — SODIUM CHLORIDE, POTASSIUM CHLORIDE, SODIUM LACTATE AND CALCIUM CHLORIDE: 600; 310; 30; 20 INJECTION, SOLUTION INTRAVENOUS at 11:40

## 2021-11-17 RX ADMIN — PHENYLEPHRINE HYDROCHLORIDE 100 MCG: 10 INJECTION INTRAVENOUS at 11:24

## 2021-11-17 RX ADMIN — PHENYLEPHRINE HYDROCHLORIDE 100 MCG: 10 INJECTION INTRAVENOUS at 11:25

## 2021-11-17 RX ADMIN — Medication 5 MG: at 09:13

## 2021-11-17 ASSESSMENT — ACTIVITIES OF DAILY LIVING (ADL)
ADLS_ACUITY_SCORE: 12
ADLS_ACUITY_SCORE: 5
ADLS_ACUITY_SCORE: 10
ADLS_ACUITY_SCORE: 5

## 2021-11-17 ASSESSMENT — MIFFLIN-ST. JEOR: SCORE: 1159.52

## 2021-11-17 NOTE — ANESTHESIA PROCEDURE NOTES
Airway       Patient location during procedure: OR       Procedure Start/Stop Times: 11/17/2021 7:50 AM  Staff -        CRNA: Argentina Ortiz APRN CRNA       Performed By: LILIANE and with CRNAs       Procedure performed by resident/fellow/CRNA in presence of a teaching physician.  Indications and Patient Condition       Indications for airway management: des-procedural       Induction type:intravenous       Mask difficulty assessment: 2 - vent by mask + OA or adjuvant +/- NMBA    Final Airway Details       Final airway type: endotracheal airway       Successful airway: ETT - single  Endotracheal Airway Details        ETT size (mm): 7.0       Successful intubation technique: video laryngoscopy       VL Blade Size: Glidescope 3       Grade View of Cords: 1       Adjucts: stylet       Position: Right       Measured from: gums/teeth       Secured at (cm): 21       Bite block used: None    Post intubation assessment        Placement verified by: capnometry, equal breath sounds and chest rise        Number of attempts at approach: 1       Number of other approaches attempted: 0       Secured with: pink tape       Ease of procedure: easy       Dentition: Intact and Unchanged

## 2021-11-17 NOTE — OP NOTE
DATE OF SURGERY: 11/17/2021    PREOPERATIVE DIAGNOSIS: Rectal prolapse    POSTOPERATIVE DIAGNOSIS: Rectal prolapse     OPERATION PERFORMED:   Intraoperative colonoscopy with polypectomy  Laparoscopic lysis of adhesions  Robotic suture rectopexy      SURGEON: Ceci Ordoñez MD     ASSISTANT:   Ida Calabrese PA-C    2nd ASSISTANT: Evelina Campoverde MD ([facility] Fellow)    ANESTHESIA: General.     INDICATIONS: The patient is a 70-year-old female who has developed progressively worsening full thickness rectal prolapse.  She first started to have full thickness prolapse about 2 years ago and it became markedly worse. She has undergone pelvic floor testing and her defecography confirms full thickness rectal prolapse. She has symptoms of pelvic pressure and fecal incontinence.  The risks and benefits of this procedure were discussed with the patient.  She will now undergo a laparoscopic robotic rectopexy.     OPERATIVE FINDINGS: An intraoperative colonoscopy was performed. This is notable for one sigmoid polyp. She also had a very redundant colon with significant looping. There were significant adhesions of the omentum to the lower abdominal wall. This required laparoscopic lysis of adhesions for at least 30 minutes. The patient's sigmoid colon was fairly redundant. The uterus was absent. The patient had a prior sacrocolpopexy with mesh. The mesh appeared to be sutured to the mid sacrum. The mesh was left intact and attached to the mid sacrum. The rectum appeared grossly normal, but there was significant laxity in the peritoneum overlying the rectum distally.  At the conclusion of the rectopexy a rectal exam confirmed that the prolapse was reduced with good support to the rectum internally.      PROCEDURE IN DETAIL: After informed consent was obtained, the patient was brought to the operating room and placed in the supine position on the operating room table. Sequential compression devices were placed on  the lower extremities prior to induction and general anesthesia was induced without difficulty. A Henriquez catheter was inserted. She was placed into the well-padded dorsal lithotomy position and IV antibiotics were administered. The rectum was prolapsed but easily reduceable. A Pigasi pad was used on the operating room table to prevent her from sliding off the table in steep Trendelenburg position. Her abdomen was sterilely prepped and draped in the usual fashion.     We began by performing an intraoperative colonoscopy. This is notable for one small sigmoid polyp. There was significant redundancy and looping of the entire colon. Please see the Provation dictation for full details regarding this portion of the procedure.    A 1.2 cm vertical supraumbilical incision was made in the skin using a #11 blade after instillation of 0.5% Marcaine in the skin and subcutaneous tissue. The abdominal wall was elevated with towel clamps and a Veress needle was inserted into the abdominal cavity through the camera port incision. After a positive saline drop test, the abdomen was insufflated with CO2 gas to a pressure of 15. A 12-mm bladeless trocar was inserted into the abdominal cavity through the supraumbilical camera port incision. A 10-mm 30-degree laparoscope was inserted into the abdomen and the abdomen was explored. The operative findings are noted above. We performed bilateral TAP blocks injecting 7.5 mL of 0.5% Marcaine into the for abdominal wall quadrants.  Four additional bladeless trocars were inserted into the abdominal cavity in the following locations.     1. An 8 mm robotic port placed in the mid clavicular line on the right side of the abdomen 10 cm lateral and just inferior to the camera port.   2. An 8 mm robotic port was placed in the mid clavicular line on the left side of the abdomen at the same level as robotic port #1.   3. Robotic port #3 was placed 4 cm above the left superior iliac crest and 8 cm lateral  from robotic port #2 on the left side of the abdomen.    4. A  5-mm Airseal assistant port was placed inferior and lateral to robotic port #1 on the right side of the abdomen.     There is significant adhesions of the omentum to the anterior abdominal wall in the lower abdomen from the patient's prior abdominal surgeries. We performed a laparoscopic lysis of adhesions using the laparoscopic scissors both sharply and with electrocautery. We spent 30 minutes lysing adhesions in order to free the omentum from the abdominal wall.    The patient was placed in steep Trendelenburg position and the small bowel was gently swept out of the pelvis using a blunt grasper. The robot was then docked in the standard fashion. The 3 robotic instruments were advanced into the abdomen under direct visualization.  A tip up grasper was used in arm #1 for retraction, a fenestrated bipolar grasper was used and #2.  The camera is in arm #3.  And a cautery EndoShears was placed in arm #4.  The suction  and an atraumatic grasper were used through the assistant port.  Prior to starting her dissection we noted that the patient had a prior sacrocolpopexy.  This mesh was actually adherent to the sacrum in the mid sacrum.  We elected to keep this mesh in place as it did not interfere with our rectopexy.  The sigmoid colon was elevated toward the anterior abdominal wall using an atraumatic grasper, exposing the inferior mesenteric artery. The uterus was retracted towards the anterior abdominal wall using an EEA sizer in the vagina. A window was created in the colonic mesentery. The distal and inferior to the inferior mesenteric artery using cautery Endoshears from robotic arm #1 and dissection proceeded from medial to lateral behind the inferior mesenteric vessels. The left ureter was clearly identified and preserved. Mobilization continued from medial to lateral dissecting behind the superior hemorrhoidal vessels and sweeping the  retroperitoneal tissue inferiorly. The superior hemorrhoidal vessels were preserved and were not divided during the operation. The presacral space was entered. The peritoneum only on the right side of the rectum was taken down and divided using cautery Endoshears.     The rectum was mobilized in the avascular plane posteriorly outside of the fascia propria of the mesorectum down to the levator muscles of the pelvic floor. Dissection continued through Waldeyer's fascia down to the anal canal. The rectum was mobilized laterally on both sides by incising the peritoneum down to the lateral stalks, but the lateral stalk was partially divided and the right side and was not divided on the left side. The anterior peritoneal reflection was not opened. Once the rectum had been completely mobilized down to the pelvic floor, it could easily be elevated in preparation for the rectopexy.      The rectum was pulled superiorly using the double fenestrated grasper and a suture rectopexy was then performed using three interrupted 2-0 Prolene sutures placed from the mesorectum and the peritoneum to the presacral fascia beginning 5 to 6 cm below the level of the sacral promontory (level of S2) and extending proximally (level of S4). All the sutures were placed along the medial longitudinal ligament and the strength of the bite was confirmed with traction applied with laparoscopic grasper. The rectum had good adherence to the sacrum and the most proximal suture was placed several centimeters below the level of the sacral promontory. The abdomen was irrigated with a copious amount of saline solution including the pelvis and sucked dry. Hemostasis was adequate.  A digital rectal exam was performed to verify that the rectum as completely reduced and had good support.     All ports were removed from the abdominal cavity and the CO2 gas was completely allowed to escape. The fascia, the supraumbilical port site was again closed using a  figure-of-eight 0 Vicryl suture under direct observation. The incisions were irrigated with saline solution, and the skin of all the port sites were closed using buried interrupted subdermal 4-0 Monocryl sutures. Steri-Strips and sterile Tegaderm dressings were applied.      The patient tolerated the procedure well without complications. Estimated blood loss was 5 mL. I was present and/or scrubbed for the entire duration of my portion of the operation.     Ceci Ordoñez MD

## 2021-11-17 NOTE — ANESTHESIA CARE TRANSFER NOTE
Patient: Ioana Solomon    Procedure: Procedure(s):  ROBOTIC SUTURE RECTOPEXY  INTRA OPERATIVE COLONOSCOPY  Laparoscopic lysis adhesions       Diagnosis: Intussusception (H) [K56.1]  Diagnosis Additional Information: No value filed.    Anesthesia Type:   General     Note:    Oropharynx: oral airway in place and spontaneously breathing  Level of Consciousness: drowsy  Oxygen Supplementation: face mask  Level of Supplemental Oxygen (L/min / FiO2): 6  Independent Airway: airway patency satisfactory and stable  Dentition: dentition unchanged  Vital Signs Stable: post-procedure vital signs reviewed and stable  Report to RN Given: handoff report given  Patient transferred to: PACU    Handoff Report: Identifed the Patient, Identified the Reponsible Provider, Reviewed the pertinent medical history, Discussed the surgical course, Reviewed Intra-OP anesthesia mangement and issues during anesthesia, Set expectations for post-procedure period and Allowed opportunity for questions and acknowledgement of understanding      Vitals:  Vitals Value Taken Time   /75 11/17/21 1200   Temp 36.4  C (97.6  F) 11/17/21 1200   Pulse 92 11/17/21 1206   Resp 20 11/17/21 1206   SpO2 98 % 11/17/21 1206   Vitals shown include unvalidated device data.    Electronically Signed By: ALVIN Morrell CRNA  November 17, 2021  12:07 PM

## 2021-11-17 NOTE — CONSULTS
[facility]  Hospitalist Service Consultation  JoAnna K. Barthell, PA-C pager [phone]    Date of Admission:  11/17/2021  Date of Consult: 11/17/2021    Assessment & Plan   Ioana Solomon is a 70 year old female with pmhx DM2, pSVT, HTN, bladder ulcer and stones, recurrent UTIs, rheumatoid arthritis, fecal and urinary incontinence, and rectal prolapse who is admitted under the care of colorectal surgery service for post operative mngt following a robotic suture rectopexy and intraoperative colonoscopy.    Rectal prolapse s/p robotic suture rectopexy and intraoperative colonoscopy (11/17/2021).  Fecal incontinence.  - Post op mngt per CRS.  - Ancef / Flagyl periop abx.  - PRNs APAP, oxycodone, dilaudid.  - LR 75ml/hr.  - PCDs.  - Resume ASA when ok with CRS.     Atypical chest pain.  Sharp, epigastric. Palpation exacerbates epigastric discomfort. EKG sinus with non-specific T wave change. Has remote hx DVT while on OCPs. Cor angio July 2021 showed mild (20%) disease in LAD.  - RRT called upon arrival to floor and trend troponin, CT PE study pending.  - Suspect gas pain related to insufflation from lap surgery.  - PTA PPI and try GI cocktail.    Rheumatoid arthritis.  Wean prednisone October 20 6:21 months of therapy.  Usually on methotrexate but this has been held for several weeks due to recurrent UTI.  - Cont hold mtx.  - Next dose of Cimzia due Tuesday 11/23.  - Continue PTA gabapentin as needed.  - Continue PTA folic acid.  - Hold PTA ibuprofen per surgical service.    Bladder ulcer and stones.  Recurrent UTI.  Hx urinary retention requiring intermittent straight cath.  Completed course of Bactrim 11/14. Urine ctx >100K Klebsiella oxytoca sensitive to Bactrim (various other resistances). UA 11/15 showing 20-50 rbcs.  - Bladder scan and straight cath.    HTN / HLD.  1v-CAD. Mild (20%) on cor angio 7/2021.  Hx pSVT.  - Resume ASA per CRS.  - Resume lisinopril and verapamil with hold  parameters.  - PTA rosuvastatin.    Type 2 diabetes. A1C 6.3 (11/4/2021).  - Hold metformin.  - Monitor BG and treat with MSSI.    Hypothyroidism. Levothyroxine.    Depression / anxiety. Venlafaxine.    Mild intermittent asthma.  Paroxsymal vocal cord dysfunction.  - PRN albuterol nebs avail.    Preoperative COVID-19. Negative 11/14/2021.    Clinically Significant Risk Factors Present on Admission              # Platelet Defect: home medication list includes an antiplatelet medication      DVT Prophylaxis: Pneumatic Compression Devices  Code Status: Full Code confirmed    This patient was discussed with Dr. Naranjo of the Hospitalist Service who agrees with current plans as outlined above.    Disposition: discharge per CRS  JoAnna K. Barthell, PA-C    Ioana Solomon MRN# [id number]   YOB: 1950 Age: 70 year old   Primary Care Physician: Nena Banks [phone]    Requesting Physician: Laurie Ordoñez  Reason for Consult: Medical co-mngt    History provided by patient and chart review.    HPI  Ioana Solomon is a 70 year old female with pmhx DM2, pSVT, HTN, bladder ulcer and stones, recurrent UTIs, rheumatoid arthritis, fecal and urinary incontinence, and rectal prolapse who is admitted under the care of colorectal surgery service for post operative mngt following a robotic suture rectopexy and intraoperative colonoscopy.    Tolerated the procedure well. Upon transport to the floor, she developed sharp epigastric chest pain without radiation, pleuritic component, or positional exacerbation. Denies diaphoresis, difficulty breathing, abd pain, n/v. EKG showed sinus rhythm with a non-specific t wave change. Additional work up per the RRT provider is pending at time of my evaluation.    Patient appears well and states her discomfort is waxing and waning. Has a remote hx of DVT while on OCPs. Had a cor angio in July that showed 20% disease in the LAD.    Weaned off of prednisone in Oct  after 6 months of therapy for RA. Is supposed to be on methotrexate, but this has been held for several weeks d/t recurrent UTIs requiring antibiotics. She completed a course of Bactrim on 11/14 and is concerned she is getting another UTI d/t increased burning dysuria - but notes this may be because of her underlying bladder ulcer and stones. UA on 11/15 at [facility] showed 20-50 rbcs. She is also concerned about urinary retention as she was unable to go a short time ago. Has a hx of urinary retention requiring intermittent self-cathing.    PAST MEDICAL HISTORY  Past Medical History:   Diagnosis Date     Anemia      Arrhythmia     PSVT     Arthritis     RA     Bilateral hearing loss      Bladder stones      Bladder ulcer      CAD (coronary artery disease)      Cataracts, bilateral      Constipation      Diabetes 1.5, managed as type 1 (H)      Diabetes mellitus, type 2 (H)      Disseminated histoplasmosis      Dyslipidemia      Elevated LFTs      History of recurrent UTIs      Hypertension      Hypogammaglobulinemia (H)      Hypothyroid      Incomplete defecation      Labral tear of shoulder, degenerative      Lung nodules      Mild chronic anemia      Obesity      Osteoarthritis      Pelvic pain      Raynaud's disease      Rectal prolapse      Renal artery aneurysm (H)      Uncomplicated asthma      Urinary incontinence      Vitamin D deficiency      Vocal cord dysfunction        PAST SURGICAL HISTORY  Past Surgical History:   Procedure Laterality Date     ABDOMEN SURGERY      3 bladder surgeries     ABDOMINOPLASTY, PANNICULECTOMY, COMBINED       APPENDECTOMY       BACK SURGERY      cervical disectomy     BACK SURGERY      lumbar hemilami     BIOPSY Right 09/1996    breast     BIOPSY Left 2001    breast     COLONOSCOPY N/A 9/17/2021    Procedure: COLONOSCOPY, WITH POLYPECTOMY AND BIOPSY;  Surgeon: Laurie Ordoñez MD;  Location:  GI     GENITOURINARY SURGERY  01/1997    Rashid urethropexy     GENITOURINARY SURGERY   1997    takedown of Burchand paravaginal repair     GENITOURINARY SURGERY  12/2003    enterocele repair, cysto, suprapubic sling     GYN SURGERY      c section x 2     GYN SURGERY  03/1983    SAMAN,S and O     HERNIA REPAIR       ORTHOPEDIC SURGERY Left     shoulder arthroscopy, biceps tenodesis     ORTHOPEDIC SURGERY      left cubital tunnel release       HOME MEDICATIONS  Prior to Admission medications    Medication Sig Last Dose Taking? Auth Provider   aspirin (ASA) 81 MG EC tablet Take 81 mg by mouth daily as needed   at PRN Yes Reported, Patient   Biotin 10 MG CAPS Take 10 mg by mouth daily  at PM Yes Reported, Patient   Certolizumab Pegol (CIMZIA SC) Inject 200 mg Subcutaneous every 14 days on Tuesdays  at AM Yes Reported, Patient   fluticasone (FLONASE) 50 MCG/ACT nasal spray Spray 2 sprays into both nostrils daily as needed for rhinitis or allergies  at PRN Yes Reported, Patient   folic acid (FOLVITE) 1 MG tablet Take 4 mg by mouth daily (1MG X 4 = 4MG)  at AM Yes Reported, Patient   gabapentin (NEURONTIN) 300 MG capsule Take 300 mg by mouth daily as needed  at PRN Yes Reported, Patient   ibuprofen (ADVIL/MOTRIN) 800 MG tablet Take 800 mg by mouth 2 times daily as needed for moderate pain  at PRN Yes Reported, Patient   levothyroxine (SYNTHROID/LEVOTHROID) 100 MCG tablet Take 100 mcg by mouth daily  at PM Yes Reported, Patient   lisinopril (ZESTRIL) 5 MG tablet Take 5 mg by mouth daily   at AM Yes Reported, Patient   metFORMIN (GLUCOPHAGE) 500 MG tablet Take 500 mg by mouth 2 times daily (with meals)   at PM Yes Reported, Patient   methotrexate 2.5 MG tablet Take 15 mg by mouth once a week on Thursdays  (2.5MG X 6 = 15MG)  at PM Yes Reported, Patient   omeprazole (PRILOSEC) 20 MG DR capsule Take 20 mg by mouth daily  at AM Yes Reported, Patient   potassium citrate 15 MEQ (1620 MG) TBCR Take 15 mEq by mouth 2 times daily  at PM Yes Reported, Patient   predniSONE (DELTASONE) 2.5 MG tablet Take 2.5 mg by mouth  "daily More than a month at PM Yes Reported, Patient   rosuvastatin (CRESTOR) 20 MG tablet Take 20 mg by mouth daily  at PM Yes Reported, Patient   UNABLE TO FIND Apply topically three times a week on to scalp  MEDICATION NAME: Compound Medication Cream  at PRN Yes Reported, Patient   valACYclovir (VALTREX) 1000 mg tablet Take 1,000 mg by mouth daily as needed (COLD SORES)  at PRN Yes Reported, Patient   venlafaxine (EFFEXOR) 37.5 MG tablet Take 37.5 mg by mouth daily   at PM Yes Reported, Patient   verapamil (CALAN) 40 MG tablet Take 40 mg by mouth 2 times daily  at AM Yes Reported, Patient   vitamin D3 (CHOLECALCIFEROL) 50 mcg (2000 units) tablet Take 1 tablet by mouth daily  at AM Yes Reported, Patient       ALLERGIES  Allergies   Allergen Reactions     Lomefloxacin Hives     Cephalexin Rash     Hydroxychloroquine Other (See Comments)     GI upset, nausea , vomiting       Levofloxacin Muscle Pain (Myalgia)     Achilles tendonitis       SOCIAL HISTORY   reports that she has never smoked. She has never used smokeless tobacco. She reports previous alcohol use.    FAMILY HISTORY  Father: CAD and RA    REVIEW OF SYSTEMS  A 10 point ROS was negative other than the symptoms noted above in the HPI.    PHYSICAL EXAM  Nursing Notes Reviewed.  /67 (BP Location: Left arm)   Pulse 96   Temp 98.3  F (36.8  C) (Oral)   Resp 16   Ht 1.702 m (5' 7\")   Wt 61.7 kg (136 lb)   SpO2 98%   BMI 21.30 kg/m     General:  Appears stated age in no acute distress.  Skin:  Warm, dry. No rashes or lesions on exposed skin.  HEENT:  Normocephalic, atraumatic. EOMs grossly intact.  Neck:  Supple.  Chest:  Breath sounds CTA. No increased work of breathing. Palpation exacerbates epigastric discomfort.  Cardiovascular:  RRR, no rub or murmur. No peripheral edema.  Abdomen:  Soft, non-tender, non-distended. Lap sites c/d/i.  Musculoskeletal:  Moves all four extremities.  Neurological:  CN 2-12 grossly intact.    Data   Data reviewed " today:  I personally reviewed the EKG tracing showing sinus with non-specific t wave abnl.  Recent Labs   Lab 11/17/21  1533 11/17/21  1419 11/17/21  1254 11/17/21  0735 11/17/21  0627   WBC 10.0  --   --   --   --    HGB 9.3*  --   --   --   --    MCV 84  --   --   --   --      --   --   --   --    NA  --   --   --   --  139   POTASSIUM  --   --   --   --  3.4  3.4   CHLORIDE  --   --   --   --  108   CO2  --   --   --   --  18*   BUN  --   --   --   --  16   CR  --   --   --   --  0.99  1.02   ANIONGAP  --   --   --   --  13   KARRIE  --   --   --   --  8.8   GLC  --  128* 129* 93 110*   ALBUMIN 3.1*  --   --   --   --    PROTTOTAL 6.0*  --   --   --   --    BILITOTAL 0.3  --   --   --   --    ALKPHOS 78  --   --   --   --    ALT 27  --   --   --   --    AST 24  --   --   --   --    LIPASE  --   --   --   --  249   TROPONIN  --   --   --   --  <0.015       Imaging:  No results found for this or any previous visit (from the past 24 hour(s)).

## 2021-11-17 NOTE — BRIEF OP NOTE
[facility]    Brief Operative Note    Pre-operative diagnosis: Intussusception (H) [K56.1]  Post-operative diagnosis Same as pre-operative diagnosis    Procedure: Procedure(s):  ROBOTIC SUTURE RECTOPEXY  INTRA OPERATIVE COLONOSCOPY  Laparoscopic lysis adhesions  Surgeon: Surgeon(s) and Role:  Panel 1:     * Laurie Ordoñez MD - Primary     * Ida Calabrese PA-C - Assisting     * Yakelin Cruz MD - Assisting  Panel 2:     * Laurie Ordoñez MD - Primary     * Evelina Campoverde MD - Fellow - Assisting  Anesthesia: General   Estimated Blood Loss: Less than 10 ml    Drains: None  Specimens:   ID Type Source Tests Collected by Time Destination   1 : sigmoid polyp biopsy Tissue Large Intestine, Colon, Sigmoid SURGICAL PATHOLOGY EXAM Laurie Ordoñez MD 11/17/2021  9:44 AM      Findings:   Diminutive igmoid polyp removed with biopsy forceps; full thickness rectal prolapse; previous mesh colpopexy  Complications: None.  Implants: * No implants in log *      ADDENDUM:    PATIENT DATA  Indicate Y or N:  Home O2 No  Hemodialysis No  Transplant patient No  Cirrhosis No  Steroids in last 30 days Yes  Immunomodulators in last 30 days Yes  Anticoagulation at time of surgery No   List medication na  Prior abdominal surgery Yes  Pelvic irradiation No    Albumin within 30 days if known n  Hgb within 30 days if known n  Cr within 30 days if known n  Body mass index is 21.3 kg/m .    OR DATA  Emergent No   <24 hours No   <1 week No  Bowel Prep (Y or N) Yes  Antibiotics (Y or N) Yes  DVT prophylaxis    Heparin Yes   SCD Yes   None No  Drain No  ASA (1,2,3,4,5 if unknown) 3  OR time (min) 220  Stents No  Transfuse >/= 2U No  Anastomosis   Stapled No   Handsewn No  Leak Test (pos, neg, not done) not done

## 2021-11-17 NOTE — PROVIDER NOTIFICATION
MD Notification    Notified Person: MD    Notified Person Name: dr Ordoñez    Notification Date/Time: 1545 11/17    Notification Interaction: phone call    Purpose of Notification:  Notified of allergy to cephalosporins       Orders Received:  Hold Ancef  unitl  Further notice    Comments:

## 2021-11-17 NOTE — ANESTHESIA PREPROCEDURE EVALUATION
Anesthesia Pre-Procedure Evaluation    Patient: Ioana Solomon   MRN: [id number] : 1950        Preoperative Diagnosis: Intussusception (H) [K56.1]    Procedure : Procedure(s):  ROBOTIC SUTURE RECTOPEXY  INTRA OPERATIVE COLONOSCOPY          Past Medical History:   Diagnosis Date     Anemia      Arrhythmia     PSVT     Arthritis     RA     Bilateral hearing loss      Bladder stones      Bladder ulcer      CAD (coronary artery disease)      Cataracts, bilateral      Constipation      Diabetes 1.5, managed as type 1 (H)      Diabetes mellitus, type 2 (H)      Disseminated histoplasmosis      Dyslipidemia      Elevated LFTs      History of recurrent UTIs      Hypertension      Hypogammaglobulinemia (H)      Hypothyroid      Incomplete defecation      Labral tear of shoulder, degenerative      Lung nodules      Mild chronic anemia      Obesity      Osteoarthritis      Pelvic pain      Raynaud's disease      Rectal prolapse      Renal artery aneurysm (H)      Uncomplicated asthma      Urinary incontinence      Vitamin D deficiency      Vocal cord dysfunction       Past Surgical History:   Procedure Laterality Date     ABDOMEN SURGERY      3 bladder surgeries     ABDOMINOPLASTY, PANNICULECTOMY, COMBINED       APPENDECTOMY       BACK SURGERY      cervical disectomy     BACK SURGERY      lumbar hemilami     BIOPSY Right 1996    breast     BIOPSY Left     breast     COLONOSCOPY N/A 2021    Procedure: COLONOSCOPY, WITH POLYPECTOMY AND BIOPSY;  Surgeon: Laurie Ordoñez MD;  Location:  GI     GENITOURINARY SURGERY  1997    Rashid urethropexy     GENITOURINARY SURGERY      takedown of Burchand paravaginal repair     GENITOURINARY SURGERY  2003    enterocele repair, cysto, suprapubic sling     GYN SURGERY      c section x 2     GYN SURGERY  1983    SAMAN,S and O     HERNIA REPAIR       ORTHOPEDIC SURGERY Left     shoulder arthroscopy, biceps tenodesis     ORTHOPEDIC SURGERY      left  cubital tunnel release      Allergies   Allergen Reactions     Levaquin [Levofloxacin]      Plaquenil [Hydroxychloroquine]       Social History     Tobacco Use     Smoking status: Never Smoker     Smokeless tobacco: Never Used   Substance Use Topics     Alcohol use: Not Currently      Wt Readings from Last 1 Encounters:   09/17/21 65.3 kg (144 lb)        Anesthesia Evaluation   Pt has had prior anesthetic. Type: General.    No history of anesthetic complications       ROS/MED HX  ENT/Pulmonary:     (+) vocal cord abnormalities -  Mild Persistent, asthma  (-) tobacco use and COPD   Neurologic:     (+) peripheral neuropathy, - DM peripheral.     Cardiovascular:     (+) Dyslipidemia hypertension--CAD ---Irregular Heartbeat/Palpitations, Previous cardiac testing   Echo: Date: 06/21 Results:  EF 50%, no valve issues noted, grade 1 diastolic dysfunction  Stress Test: Date: Results:    ECG Reviewed: Date: Results:    Cath: Date: 07/21 Results:  Normal cath (-) past MI, CHF, stent, past MI and CABG   METS/Exercise Tolerance:     Hematologic:     (+) History of blood clots, anemia,     Musculoskeletal:   (+) arthritis,     GI/Hepatic:     (+) bowel prep,  (-) GERD and liver disease   Renal/Genitourinary:     (+) renal disease, type: CRI,     Endo:     (+) type II DM, Diabetic complications: nephropathy neuropathy. thyroid problem, hypothyroidism, Obesity,     Psychiatric/Substance Use:       Infectious Disease: Comment: histoplasmosis      Malignancy:       Other:            Physical Exam    Airway        Mallampati: II    Neck ROM: full   Mouth opening: > 3 cm    Respiratory Devices and Support         Dental  no notable dental history     (+) upper retainer      Cardiovascular   cardiovascular exam normal          Pulmonary   pulmonary exam normal                OUTSIDE LABS:  CBC: No results found for: WBC, HGB, HCT, PLT  BMP: No results found for: NA, POTASSIUM, CHLORIDE, CO2, BUN, CR, GLC  COAGS: No results found for:  PTT, INR, FIBR  POC: No results found for: BGM, HCG, HCGS  HEPATIC: No results found for: ALBUMIN, PROTTOTAL, ALT, AST, GGT, ALKPHOS, BILITOTAL, BILIDIRECT, GERALD  OTHER: No results found for: PH, LACT, A1C, KARRIE, PHOS, MAG, LIPASE, AMYLASE, TSH, T4, T3, CRP, SED    Anesthesia Plan    ASA Status:  3   NPO Status:  NPO Appropriate    Anesthesia Type: General.     - Airway: ETT   Induction: Intravenous.   Maintenance: Inhalation.   Techniques and Equipment:     - Lines/Monitors: 2nd IV     Consents    Anesthesia Plan(s) and associated risks, benefits, and realistic alternatives discussed. Questions answered and patient/representative(s) expressed understanding.    - Discussed:     - Discussed with:  Patient      - Extended Intubation/Ventilatory Support Discussed: No.      - Patient is DNR/DNI Status: No    Use of blood products discussed: Yes.     - Discussed with: Patient.     - Consented: consented to blood products            Reason for refusal: other.     Postoperative Care    Pain management: IV analgesics, Multi-modal analgesia.     - Plan for long acting post-op opioid use   PONV prophylaxis: Ondansetron (or other 5HT-3), Aprepitant     Comments:    Other Comments: Type and screen  Albumin for expected bowel prep and steep Trendelenburg position            Yonathan Love MD

## 2021-11-17 NOTE — CODE/RAPID RESPONSE
"[facility]    RRT Note  11/17/2021   Time Called: 214pm    RRT called for: chest pain    Assessment & Plan   IMPRESSION & PLAN:    Ioana Solomon is a 70 year old female w/ PMH of multiple  surgeries, SVT, rheumatoid arthritis no longer on steroids or methotrexate, nonocclusive CAD, with negative stress test in May 2021 and negative coronary angiogram in July 2021, vocal cord dysfunction, 2 prior episodes of DVT occurring in the postpartum/miscarriage timeframe, hypothyroidism, DM 2, CKD 2 who was admitted on 11/17/2021 for Lysis of adhesions to relieve intussusception.  Duration of procedure was nearly 4 hours.  Shortly after arriving on the unit she reported to nursing staff substernal chest pain 5/10.  For this reason rapid response was activated    There is no diaphoresis patient describes the pain as deep.  She does not \"think it is her heart\".  She is not had any prior similar episodes.  There is no presyncope or pleurisy.  There is no dizziness on transferring up to the bathroom.  There is no radiation to jaw or neck it is more in the epigastrium/lower chest area.  Without intervention and had decreased from 5-->4/10.  My evaluation was interrupted by another rapid response.  On return patient continues to look nondistressed and continues to report pain 5/10.  Heart rate is 96, /64    VS: As above  immuncompromised yes but recently stopped methotrexate    Impression  Chest pain  differential diagnosis:  -Considered ACS though lower suspicion for such given her negative stress test and coronary angiogram in May and July 2021 respectively.  Strongly suspicious for PE given 2 prior DVTs although this was remote.  Wells score is 6 which portends a moderate risk for 16%.  -May be GI/dyspepsia  -May be referred to secondary to air insufflation during her laparoscopic surgery   -Atelectasis  -Chest wall pain    INTERVENTIONS:  -stat ekg--> normal sinus rhythm normal axis grossly " similar to study earlier in the morning, 6:39 AM on 11/17/2021.  Isolated flat versus mild T wave inversion in aVL but no other contiguous changes  -add on BMP, hepatic profile, lipase  -telemetry  -troponin  -ct angio chest PE protocol  -Pepcid 20 mg IV twice daily.  Working diagnosis: Atypical chest pain less likely ACS , Need to rule out PE    At the end of the RRT EKG has been completed and reviewed, labs are being drawn, CT angiogram is pending    disposition continue current level of care    Discussed with and defer further cares to colorectal surgery attending physician  (paged, she is in OR, awaiting call back) and consulting hospitalist provider    Code Status: Full Code    Allergies   Allergies   Allergen Reactions     Lomefloxacin Hives     Cephalexin Rash     Hydroxychloroquine Other (See Comments)     GI upset, nausea , vomiting       Levofloxacin Muscle Pain (Myalgia)     Achilles tendonitis       Physical Exam   Vital Signs with Ranges:  Temp:  [97  F (36.1  C)-97.9  F (36.6  C)] 97  F (36.1  C)  Pulse:  [] 86  Resp:  [14-27] 15  BP: (114-144)/(59-75) 129/75  SpO2:  [93 %-99 %] 95 %  I/O last 3 completed shifts:  In: 2600 [I.V.:2600]  Out: 153 [Urine:150; Blood:3]    Constitutional: vs as above  General:  adult pt lying in bed without acute distress  Neuro: +follows commands wiggle toes and show 2 fingers bilat, face symmetric, tongue midline, speech fluent  Eyes pupils equal round 3mm briskly reactive bilat, sclera nonicteric, noninjected, conjunctiva pink,  Head, ENT & mouth: NC/AT,  dry oral mucosa  Neck: supple  CV S1S2 NSR  resp: CTAB upper and lower lobes  gi:hypoactive bowel sounds, soft, nontender, nondisteded multiple lap sites  Ext: no edema or mottling, warm skin  Skin: no rashes on exposed skin  Lymph: defer  Musculoskeletal no bony joint deformities    Data     EKG:  Interpreted by ALVIN Duarte CNP  Time reviewed: 303pm  Symptoms at time of EKG: chest pain   Rhythm:  normal sinus   Rate: Normal  Axis: Left Axis Deviation  Ectopy: none  Conduction: normal  ST Segments/ T Waves: No acute ischemic changes  Q Waves: none  Comparison to prior: Unchanged from 639am 11/17/21    Troponin:    Pending    IMAGING: (X-ray/CT/MRI)   No results found for this or any previous visit (from the past 24 hour(s)).    CBC with Diff:  No lab results found.     Comprehensive Metabolic Panel:  Recent Labs   Lab 11/17/21  1419 11/17/21  0735 11/17/21  0627   POTASSIUM  --   --  3.4   *   < >  --    CR  --   --  1.02   GFRESTIMATED  --   --  55*    < > = values in this interval not displayed.       Time Spent on this Encounter   I spent 30 minutes (221-230pm, 3-321pm) of critical care time on the unit/floor managing the care of Ioana Solomon. Upon evaluation, this patient had a high probability of imminent or life-threatening deterioration due to chest pain, which required my direct attention, intervention, and personal management including review of ekg, decision to pursue CTA chest 100% of my time was spent at the bedside counseling the patient and/or coordinating care regarding services listed in this note.    Mar yLou Hamilton, [facility]  Hospitalist [place] ROBERTO  [phone]

## 2021-11-17 NOTE — OR NURSING
Colonoscopy done in MRI/OR2.  One sigmoid polyp removed by cold bx and left with circulating RN.  Exam otherwise without incident.

## 2021-11-17 NOTE — ANESTHESIA POSTPROCEDURE EVALUATION
Patient: Ioana Solomon    Procedure: Procedure(s):  ROBOTIC SUTURE RECTOPEXY  INTRA OPERATIVE COLONOSCOPY  Laparoscopic lysis adhesions       Diagnosis:Intussusception (H) [K56.1]  Diagnosis Additional Information: No value filed.    Anesthesia Type:  General    Note:  Disposition: Inpatient   Postop Pain Control: Uneventful            Sign Out: Well controlled pain   PONV: No   Neuro/Psych: Uneventful            Sign Out: Acceptable/Baseline neuro status   Airway/Respiratory: Uneventful            Sign Out: Acceptable/Baseline resp. status   CV/Hemodynamics: Uneventful            Sign Out: Acceptable CV status   Other NRE: NONE   DID A NON-ROUTINE EVENT OCCUR? No           Last vitals:  Vitals Value Taken Time   /65 11/17/21 1350   Temp 36.1  C (97  F) 11/17/21 1300   Pulse 92 11/17/21 1353   Resp 16 11/17/21 1353   SpO2 97 % 11/17/21 1353   Vitals shown include unvalidated device data.    Electronically Signed By: Yonathan Love MD  November 17, 2021  4:34 PM

## 2021-11-18 LAB
ALBUMIN SERPL-MCNC: 2.9 G/DL (ref 3.4–5)
ALP SERPL-CCNC: 70 U/L (ref 40–150)
ALT SERPL W P-5'-P-CCNC: 26 U/L (ref 0–50)
ANION GAP SERPL CALCULATED.3IONS-SCNC: 6 MMOL/L (ref 3–14)
AST SERPL W P-5'-P-CCNC: 21 U/L (ref 0–45)
ATRIAL RATE - MUSE: 89 BPM
BILIRUB DIRECT SERPL-MCNC: 0.1 MG/DL (ref 0–0.2)
BILIRUB SERPL-MCNC: 0.4 MG/DL (ref 0.2–1.3)
BUN SERPL-MCNC: 12 MG/DL (ref 7–30)
CALCIUM SERPL-MCNC: 8.7 MG/DL (ref 8.5–10.1)
CHLORIDE BLD-SCNC: 109 MMOL/L (ref 94–109)
CO2 SERPL-SCNC: 24 MMOL/L (ref 20–32)
CREAT SERPL-MCNC: 0.91 MG/DL (ref 0.52–1.04)
DIASTOLIC BLOOD PRESSURE - MUSE: NORMAL MMHG
ERYTHROCYTE [DISTWIDTH] IN BLOOD BY AUTOMATED COUNT: 17.3 % (ref 10–15)
GFR SERPL CREATININE-BSD FRML MDRD: 63 ML/MIN/1.73M2
GLUCOSE BLD-MCNC: 100 MG/DL (ref 70–99)
GLUCOSE BLDC GLUCOMTR-MCNC: 100 MG/DL (ref 70–99)
GLUCOSE BLDC GLUCOMTR-MCNC: 124 MG/DL (ref 70–99)
GLUCOSE BLDC GLUCOMTR-MCNC: 135 MG/DL (ref 70–99)
GLUCOSE BLDC GLUCOMTR-MCNC: 139 MG/DL (ref 70–99)
GLUCOSE BLDC GLUCOMTR-MCNC: 74 MG/DL (ref 70–99)
HCT VFR BLD AUTO: 28.5 % (ref 35–47)
HGB BLD-MCNC: 8.7 G/DL (ref 11.7–15.7)
INTERPRETATION ECG - MUSE: NORMAL
LIPASE SERPL-CCNC: 116 U/L (ref 73–393)
MAGNESIUM SERPL-MCNC: 2.1 MG/DL (ref 1.6–2.3)
MCH RBC QN AUTO: 25.9 PG (ref 26.5–33)
MCHC RBC AUTO-ENTMCNC: 30.5 G/DL (ref 31.5–36.5)
MCV RBC AUTO: 85 FL (ref 78–100)
P AXIS - MUSE: 65 DEGREES
PHOSPHATE SERPL-MCNC: 2.9 MG/DL (ref 2.5–4.5)
PLATELET # BLD AUTO: 242 10E3/UL (ref 150–450)
POTASSIUM BLD-SCNC: 3.7 MMOL/L (ref 3.4–5.3)
PR INTERVAL - MUSE: 140 MS
PROT SERPL-MCNC: 6 G/DL (ref 6.8–8.8)
QRS DURATION - MUSE: 84 MS
QT - MUSE: 410 MS
QTC - MUSE: 498 MS
R AXIS - MUSE: -6 DEGREES
RBC # BLD AUTO: 3.36 10E6/UL (ref 3.8–5.2)
SODIUM SERPL-SCNC: 139 MMOL/L (ref 133–144)
SYSTOLIC BLOOD PRESSURE - MUSE: NORMAL MMHG
T AXIS - MUSE: 109 DEGREES
TROPONIN I SERPL-MCNC: <0.015 UG/L (ref 0–0.04)
VENTRICULAR RATE- MUSE: 89 BPM
WBC # BLD AUTO: 8.6 10E3/UL (ref 4–11)

## 2021-11-18 PROCEDURE — 99232 SBSQ HOSP IP/OBS MODERATE 35: CPT | Performed by: PHYSICIAN ASSISTANT

## 2021-11-18 PROCEDURE — 250N000013 HC RX MED GY IP 250 OP 250 PS 637: Performed by: PHYSICIAN ASSISTANT

## 2021-11-18 PROCEDURE — 258N000003 HC RX IP 258 OP 636: Performed by: COLON & RECTAL SURGERY

## 2021-11-18 PROCEDURE — 84100 ASSAY OF PHOSPHORUS: CPT | Performed by: COLON & RECTAL SURGERY

## 2021-11-18 PROCEDURE — 83735 ASSAY OF MAGNESIUM: CPT | Performed by: COLON & RECTAL SURGERY

## 2021-11-18 PROCEDURE — 36415 COLL VENOUS BLD VENIPUNCTURE: CPT | Performed by: COLON & RECTAL SURGERY

## 2021-11-18 PROCEDURE — 250N000013 HC RX MED GY IP 250 OP 250 PS 637: Performed by: COLON & RECTAL SURGERY

## 2021-11-18 PROCEDURE — 85027 COMPLETE CBC AUTOMATED: CPT | Performed by: COLON & RECTAL SURGERY

## 2021-11-18 PROCEDURE — 80069 RENAL FUNCTION PANEL: CPT | Performed by: COLON & RECTAL SURGERY

## 2021-11-18 PROCEDURE — 83690 ASSAY OF LIPASE: CPT | Performed by: PHYSICIAN ASSISTANT

## 2021-11-18 PROCEDURE — 99207 PR CDG-MDM COMPONENT: MEETS MODERATE - DOWN CODED: CPT | Performed by: PHYSICIAN ASSISTANT

## 2021-11-18 PROCEDURE — 250N000011 HC RX IP 250 OP 636: Performed by: COLON & RECTAL SURGERY

## 2021-11-18 PROCEDURE — 84484 ASSAY OF TROPONIN QUANT: CPT | Performed by: PHYSICIAN ASSISTANT

## 2021-11-18 PROCEDURE — 82248 BILIRUBIN DIRECT: CPT | Performed by: PHYSICIAN ASSISTANT

## 2021-11-18 PROCEDURE — 120N000001 HC R&B MED SURG/OB

## 2021-11-18 RX ORDER — IBUPROFEN 600 MG/1
600 TABLET, FILM COATED ORAL EVERY 6 HOURS PRN
Status: DISCONTINUED | OUTPATIENT
Start: 2021-11-18 | End: 2021-11-19 | Stop reason: HOSPADM

## 2021-11-18 RX ORDER — OXYCODONE HYDROCHLORIDE 5 MG/1
5 TABLET ORAL EVERY 4 HOURS PRN
Qty: 15 TABLET | Refills: 0 | Status: SHIPPED | OUTPATIENT
Start: 2021-11-18

## 2021-11-18 RX ORDER — SIMETHICONE 80 MG
80 TABLET,CHEWABLE ORAL EVERY 6 HOURS PRN
Status: DISCONTINUED | OUTPATIENT
Start: 2021-11-18 | End: 2021-11-19 | Stop reason: HOSPADM

## 2021-11-18 RX ADMIN — VERAPAMIL HYDROCHLORIDE 40 MG: 40 TABLET, FILM COATED ORAL at 20:29

## 2021-11-18 RX ADMIN — LISINOPRIL 5 MG: 5 TABLET ORAL at 09:24

## 2021-11-18 RX ADMIN — IBUPROFEN 600 MG: 600 TABLET ORAL at 20:31

## 2021-11-18 RX ADMIN — FOLIC ACID 4 MG: 1 TABLET ORAL at 09:25

## 2021-11-18 RX ADMIN — OMEPRAZOLE 20 MG: 20 CAPSULE, DELAYED RELEASE ORAL at 09:24

## 2021-11-18 RX ADMIN — LEVOTHYROXINE SODIUM 100 MCG: 50 TABLET ORAL at 20:29

## 2021-11-18 RX ADMIN — VENLAFAXINE 37.5 MG: 37.5 TABLET ORAL at 20:29

## 2021-11-18 RX ADMIN — ROSUVASTATIN CALCIUM 20 MG: 20 TABLET, FILM COATED ORAL at 20:29

## 2021-11-18 RX ADMIN — VERAPAMIL HYDROCHLORIDE 40 MG: 40 TABLET, FILM COATED ORAL at 09:24

## 2021-11-18 RX ADMIN — ACETAMINOPHEN 975 MG: 325 TABLET, FILM COATED ORAL at 06:24

## 2021-11-18 RX ADMIN — ACETAMINOPHEN 975 MG: 325 TABLET, FILM COATED ORAL at 22:45

## 2021-11-18 RX ADMIN — ACETAMINOPHEN 975 MG: 325 TABLET, FILM COATED ORAL at 14:22

## 2021-11-18 RX ADMIN — IBUPROFEN 600 MG: 600 TABLET ORAL at 14:22

## 2021-11-18 RX ADMIN — SIMETHICONE 80 MG: 80 TABLET, CHEWABLE ORAL at 11:10

## 2021-11-18 RX ADMIN — SODIUM CHLORIDE, POTASSIUM CHLORIDE, SODIUM LACTATE AND CALCIUM CHLORIDE: 600; 310; 30; 20 INJECTION, SOLUTION INTRAVENOUS at 06:40

## 2021-11-18 RX ADMIN — METRONIDAZOLE 500 MG: 500 INJECTION, SOLUTION INTRAVENOUS at 04:08

## 2021-11-18 ASSESSMENT — ACTIVITIES OF DAILY LIVING (ADL)
ADLS_ACUITY_SCORE: 5

## 2021-11-18 NOTE — PROGRESS NOTES
[facility]    Hospitalist Progress Note      Assessment & Plan   Ioana Solomon is a 70 year old female with pmhx DM2, pSVT, HTN, bladder ulcer and stones, recurrent UTIs, rheumatoid arthritis, fecal and urinary incontinence, and rectal prolapse who is admitted under the care of colorectal surgery service for post operative mngt following a robotic suture rectopexy and intraoperative colonoscopy.     Rectal prolapse s/p robotic suture rectopexy and intraoperative colonoscopy (11/17/2021).  Fecal incontinence.  - Post op mngt per CRS.  - Ancef / Flagyl periop abx.  - PRNs APAP, oxycodone, dilaudid.  - LR 75ml/hr.  - PCDs.  - Resume ASA when ok with CRS.     Atypical chest pain/epigastric pain  Sharp, epigastric. Palpation exacerbates epigastric discomfort. EKG sinus with non-specific T wave change. Has remote hx DVT while on OCPs. Cor angio July 2021 showed mild (20%) disease in LAD.  Troponin negative x2.   - RRT called upon arrival to floor; CT PE study obtained and negative   --pain today is more epigastric, worse with eating.   --already on PPI  --LFT, lipase stable  --discussed with CRS today; will keep tonight to ensure pain improving    Rheumatoid arthritis.  Weaned off prednisone October.  Usually on methotrexate but this has been held for several weeks due to recurrent UTI.  - Cont hold mtx.  - Next dose of Cimzia due Tuesday 11/23.  - Continue PTA gabapentin as needed.  - Continue PTA folic acid.  - Hold PTA ibuprofen per surgical service.     Bladder ulcer and stones.  Recurrent UTI.  Hx urinary retention requiring intermittent straight cath.  Completed course of Bactrim 11/14. Urine ctx >100K Klebsiella oxytoca sensitive to Bactrim (various other resistances). UA 11/15 showing 20-50 rbcs.  Retention seems to be slowly improving this pm.   - Bladder scan and straight cath prn     HTN / HLD.  1v-CAD. Mild (20%) on cor angio 7/2021.  Hx pSVT.  - Resume ASA per CRS.  - Resume  lisinopril and verapamil with hold parameters.  - PTA rosuvastatin.     Type 2 diabetes. A1C 6.3 (11/4/2021).  - sliding scale insulin while in the hospital, resume PTA metformin at discharge     Hypothyroidism. continue Levothyroxine.     Depression / anxiety. conitnue Venlafaxine.     Mild intermittent asthma.  Paroxsymal vocal cord dysfunction.  - PRN albuterol nebs avail.     Preoperative COVID-19. Negative 11/14/2021.     DVT Prophylaxis: Defer to primary service  Code Status: Full Code    Disposition: Expected discharge per Colorectal Surgery. discussed with CRS team. Pt will remain in hospital tonight. We will follow up in am     Patient was discussed with Dr. Vargas who agrees with the above plan     Brianna Rock PA-C    Interval History   Continues to report pain spanning her abdomen below diaphragm worse in epigastric area. Worse with po intake. No Cp, SOB, diaphoresis, nausea, vomiting. Feeling some pain in right shoulder as well.     -Data reviewed today:    Physical Exam   Temp: 97.3  F (36.3  C) Temp src: Oral BP: 114/57 Pulse: 75   Resp: 16 SpO2: 97 % O2 Device: None (Room air) Oxygen Delivery: 3 LPM  Vitals:    11/17/21 0641   Weight: 61.7 kg (136 lb)     Vital Signs with Ranges  Temp:  [97  F (36.1  C)-98.3  F (36.8  C)] 97.3  F (36.3  C)  Pulse:  [] 75  Resp:  [14-27] 16  BP: (112-144)/(49-75) 114/57  SpO2:  [93 %-99 %] 97 %  I/O last 3 completed shifts:  In: 2926.5 [I.V.:2926.5]  Out: 2353 [Urine:2350; Blood:3]    Constitutional: Alert and oriented, sitting up in bed. Appears comfortable and is appropriately conversant   ENT: moist mucous membranes  Eyes;  Sclera anicteric, EOMI  Respiratory: Lungs clear to auscultation bilaterally, no increased work of breathing  Cardiovascular: Regular rate and rhythm  GI: positive bowel sounds. Abdomen is soft, appropriately tender though more tender epigastric area. Incisions c/d/i  MSK:  Moves all four extremities. Normal tone   Neuro:  Speech is  clear. Face symmetric. Follows commands.     Medications     lactated ringers 75 mL/hr at 11/18/21 0640       acetaminophen  975 mg Oral Q8H     Biotin  10 mg Oral Daily     folic acid  4 mg Oral Daily     insulin aspart  1-7 Units Subcutaneous TID AC     insulin aspart  1-5 Units Subcutaneous At Bedtime     levothyroxine  100 mcg Oral Daily     lisinopril  5 mg Oral Daily     omeprazole  20 mg Oral Daily     rosuvastatin  20 mg Oral Daily     sodium chloride (PF)  3 mL Intracatheter Q8H     venlafaxine  37.5 mg Oral Daily     verapamil  40 mg Oral BID       Data   Recent Labs   Lab 11/18/21  0216 11/17/21  1810 11/17/21  1533 11/17/21  0735 11/17/21  0627   WBC  --   --  10.0  --   --    HGB  --   --  9.3*  --   --    MCV  --   --  84  --   --    PLT  --   --  249  --   --    NA  --   --  139  --  139   POTASSIUM  --   --  3.9  --  3.4  3.4   CHLORIDE  --   --  108  --  108   CO2  --   --  17*  --  18*   BUN  --   --  15  --  16   CR  --   --  0.86  --  0.99  1.02   ANIONGAP  --   --  14  --  13   KARRIE  --   --  8.3*  --  8.8   * 119* 141*   < > 110*   ALBUMIN  --   --  3.1*  --   --    PROTTOTAL  --   --  6.0*  --   --    BILITOTAL  --   --  0.3  --   --    ALKPHOS  --   --  78  --   --    ALT  --   --  27  --   --    AST  --   --  24  --   --    LIPASE  --   --   --   --  249   TROPONIN  --   --  <0.015  --  <0.015    < > = values in this interval not displayed.       Recent Results (from the past 24 hour(s))   CT Chest Pulmonary Embolism w Contrast    Narrative    EXAM: CT CHEST PULMONARY EMBOLISM W CONTRAST  LOCATION: [facility]  DATE/TIME: 11/17/2021 4:41 PM    INDICATION: Covid 19. Shortness of breath. PE suspected, high prob. Laparoscopy and suture rectopexy earlier today.  COMPARISON: CT chest exam 07/27/2021 and 05/03/2021  TECHNIQUE: CT chest pulmonary angiogram during arterial phase injection of IV contrast. Multiplanar reformats and MIP reconstructions were performed.  Dose reduction techniques were used.   CONTRAST: 58  ml Isovue 370    FINDINGS:  ANGIOGRAM CHEST: Pulmonary arteries are normal caliber and negative for pulmonary emboli. Thoracic aorta is negative for dissection. No CT evidence of right heart strain.    LUNGS AND PLEURA: 1.8 x 1.6 cm nodule superior segment right lower lobe on image 124 series 7, and 10 x 6 mm pulmonary nodule right lower lobe laterally on image 190 are unchanged. Subsegmental atelectasis and septal thickening both lower lobes. Mosaic   attenuation pattern lower lung fields suggesting air trapping. No airspace infiltrates or pleural effusions.     MEDIASTINUM/AXILLAE: A few slightly prominent mediastinal and right hilar lymph nodes are unchanged.    CORONARY ARTERY CALCIFICATION: Moderate.    UPPER ABDOMEN: Numerous collections of free intraperitoneal air upper abdomen. Diffuse fatty infiltration of the liver.    MUSCULOSKELETAL: Hypertrophic changes thoracic spine.      Impression    IMPRESSION:  1.  No evidence for pulmonary emboli.  2.  Stable right lower lobe pulmonary nodules.  3.  Subsegmental atelectasis both lower lobes. Mosaic attenuation pattern can be seen with air trapping or small vessel/airways disease.  4.  Free intraperitoneal air presumed related to laparoscopy earlier today.  5.  Results discussed with the patient's nurse on 11/17/2021 at 6:40 PM.

## 2021-11-18 NOTE — PLAN OF CARE
Summary:  11-18-21 [id number]     Pt A/O x4. VSS on RA. C/O abdominal pain. Splinting and ice used and Incentive spirometer. Patient had an RRT the previous shift for angina. No complaints of angina or SOB during this shift. Denied nausea. Tele- NSR. Patient feeling bladder fullness. Bladder scanned and straight cathed twice. L PIV running LR at 75ml/hr. Clear liquid diet. Assist of 1 with a gaitbelt. Lungs clear. Bowel sounds active. Lap sites intact. Continue to monitor. Discharge pending.

## 2021-11-18 NOTE — PLAN OF CARE
Pt is A/Ox4, POD1, VSS. Up IND. Pt states pain to be at a 4/10, complaining of epigastric pain and shoulder pain. Pt is tolerating a full liquid diet. Pt is still experiencing urinary retention, however it is improving. IV is saline locked. Pt to be straight cath'd prior to discharge. Lap sites CDI, approximated with liquid bandage.     Spoke with both colorectal surgery ([phone]) and Hospitalist service ([phone]) regarding possible discharge and alternatives to narcotic pain control, and requested advisement pertaining to both. Possible that she will be staying another night.

## 2021-11-18 NOTE — PROGRESS NOTES
COLON & RECTAL SURGERY PROGRESS NOTE  Colon/Rectal Surgery Fellow  POD# 1 robotic suture rectopexy    SUBJECTIVE:  Doing well. Continues to have sharp, constant epigastric pain. CTPA neg for PE, trop N. Otherwise, pain well controlled at incision sites. No nausea/ vomiting. Passing gas.     VITALS:  B/P: 118/52, T: 98.1, P: 84, R: 16  Patient Vitals for the past 24 hrs:   BP Temp Temp src Pulse Resp SpO2   11/17/21 2338 118/52 98.1  F (36.7  C) Oral 84 16 96 %   11/17/21 1955 112/49 98  F (36.7  C) Oral 93 16 97 %   11/17/21 1609 135/67 98.3  F (36.8  C) Oral 96 16 98 %   11/17/21 1400 129/75 -- -- -- -- 95 %   11/17/21 1357 -- -- -- -- -- 97 %   11/17/21 1350 119/65 -- -- 86 15 96 %   11/17/21 1340 118/59 -- -- 80 16 93 %   11/17/21 1330 114/63 -- -- 86 16 94 %   11/17/21 1320 118/64 -- -- 80 14 96 %   11/17/21 1310 122/62 -- -- 81 16 97 %   11/17/21 1300 134/72 97  F (36.1  C) Temporal 86 16 98 %   11/17/21 1250 130/68 -- -- 80 17 99 %   11/17/21 1240 132/64 -- -- 87 16 99 %   11/17/21 1230 130/66 -- -- 86 14 98 %   11/17/21 1220 (!) 144/74 97.9  F (36.6  C) Temporal 91 16 96 %   11/17/21 1210 (!) 140/71 -- -- 87 27 99 %   11/17/21 1200 139/75 97.6  F (36.4  C) Temporal 100 17 98 %       Intake/Output Summary (Last 24 hours) at 11/18/2021 0722  Last data filed at 11/18/2021 0640  Gross per 24 hour   Intake 2926.5 ml   Output 2353 ml   Net 573.5 ml       EXAM:  General Appearance: A+Ox3, NAD  Abdomen: soft, appropriately tender, not distended, incisions CDI     RECENT LABS:  Recent Labs   Lab Test 11/17/21  1533   WBC 10.0   RBC 3.50*   HGB 9.3*   HCT 29.3*   MCV 84   MCH 26.6   MCHC 31.7        Recent Labs   Lab Test 11/17/21  1533 11/17/21  0627   POTASSIUM 3.9 3.4  3.4   CHLORIDE 108 108   BUN 15 16     No lab results found.    ASSESSMENT AND PLAN: 71yo F POD1 robotic suture rectopexy.   - advance to full liquid diet  - mIVF  - encourage ambulation  - ok to discharge home once medically cleared by  hospitalist    Evelina Campoverde MD ....................  11/18/2021   7:22 AM  Colon and Rectal Surgery Fellow    Colon and Rectal Surgery Staff  I performed a history and physical examination of the patient and discussed their management with the physician assistant. I reviewed the physician assistants note and agree with the documented findings and plan of care.     Post-op chest pain persists.  Cardiac and pulm work=up negative. VSS and labs loosk good this am.  Reports pain under diaphragm and right shoulder with deep breaths.    Exam:  Alert, NAD  abd soft, NT, ND, mild ttp per incisions  Incisions c/d/i    Plan:   Keep patient today given persistent chest pain.  If intra-abdominal would expect change in vitals, labs or clinical exam.  Suspect related to gas insufflation.  Cont fulls for now  IV/PO pain meds  OOB and ambulation  Straight cath prn per patient    Ceic Ordoñez MD, FACS    [facility]  [address] [address]  T: [phone]  F: [phone]

## 2021-11-19 VITALS
WEIGHT: 136 LBS | TEMPERATURE: 97.1 F | DIASTOLIC BLOOD PRESSURE: 60 MMHG | OXYGEN SATURATION: 96 % | BODY MASS INDEX: 21.35 KG/M2 | HEIGHT: 67 IN | SYSTOLIC BLOOD PRESSURE: 135 MMHG | HEART RATE: 79 BPM | RESPIRATION RATE: 16 BRPM

## 2021-11-19 LAB
ERYTHROCYTE [DISTWIDTH] IN BLOOD BY AUTOMATED COUNT: 17.4 % (ref 10–15)
GLUCOSE BLDC GLUCOMTR-MCNC: 99 MG/DL (ref 70–99)
HCT VFR BLD AUTO: 28.6 % (ref 35–47)
HGB BLD-MCNC: 8.7 G/DL (ref 11.7–15.7)
MCH RBC QN AUTO: 26.2 PG (ref 26.5–33)
MCHC RBC AUTO-ENTMCNC: 30.4 G/DL (ref 31.5–36.5)
MCV RBC AUTO: 86 FL (ref 78–100)
PATH REPORT.COMMENTS IMP SPEC: NORMAL
PATH REPORT.COMMENTS IMP SPEC: NORMAL
PATH REPORT.FINAL DX SPEC: NORMAL
PATH REPORT.GROSS SPEC: NORMAL
PATH REPORT.MICROSCOPIC SPEC OTHER STN: NORMAL
PATH REPORT.RELEVANT HX SPEC: NORMAL
PHOTO IMAGE: NORMAL
PLATELET # BLD AUTO: 247 10E3/UL (ref 150–450)
RBC # BLD AUTO: 3.32 10E6/UL (ref 3.8–5.2)
WBC # BLD AUTO: 6.9 10E3/UL (ref 4–11)

## 2021-11-19 PROCEDURE — 250N000013 HC RX MED GY IP 250 OP 250 PS 637: Performed by: PHYSICIAN ASSISTANT

## 2021-11-19 PROCEDURE — 85027 COMPLETE CBC AUTOMATED: CPT | Performed by: PHYSICIAN ASSISTANT

## 2021-11-19 PROCEDURE — 88305 TISSUE EXAM BY PATHOLOGIST: CPT | Mod: 26 | Performed by: PATHOLOGY

## 2021-11-19 PROCEDURE — 250N000013 HC RX MED GY IP 250 OP 250 PS 637: Performed by: COLON & RECTAL SURGERY

## 2021-11-19 PROCEDURE — 99232 SBSQ HOSP IP/OBS MODERATE 35: CPT | Performed by: PHYSICIAN ASSISTANT

## 2021-11-19 PROCEDURE — 36415 COLL VENOUS BLD VENIPUNCTURE: CPT | Performed by: PHYSICIAN ASSISTANT

## 2021-11-19 RX ADMIN — VERAPAMIL HYDROCHLORIDE 40 MG: 40 TABLET, FILM COATED ORAL at 09:17

## 2021-11-19 RX ADMIN — ACETAMINOPHEN 975 MG: 325 TABLET, FILM COATED ORAL at 06:13

## 2021-11-19 RX ADMIN — IBUPROFEN 600 MG: 600 TABLET ORAL at 02:27

## 2021-11-19 RX ADMIN — OMEPRAZOLE 20 MG: 20 CAPSULE, DELAYED RELEASE ORAL at 09:17

## 2021-11-19 RX ADMIN — LISINOPRIL 5 MG: 5 TABLET ORAL at 09:18

## 2021-11-19 RX ADMIN — FOLIC ACID 4 MG: 1 TABLET ORAL at 09:17

## 2021-11-19 ASSESSMENT — ACTIVITIES OF DAILY LIVING (ADL)
ADLS_ACUITY_SCORE: 5

## 2021-11-19 NOTE — PLAN OF CARE
A/O x4. VSS on RA. C/O of upper abdominal pain. No SOB, Splinting and ice used and Incentive spirometer. Denied nausea. Tele- NSR. Pain managed w/tylenol & ibuprofen. Patient stating feeling fine regarding her bladder, voiding few times. L PIV running LR at 75ml/hr. Low fiber diet. Walked to bathroom independently. Lungs sound clear. Bowel sounds active. Lap sites intact. Continue to monitor. Discharge: possible today.

## 2021-11-19 NOTE — DISCHARGE SUMMARY
[facility] [place] Discharge Summary      Ioana Solomon MRN# [id number]   Age: 70 year old YOB: 1950     Date of Admission:  11/17/2021  Date of Discharge::  11/19/2021  9:59 AM  Admitting Physician:  Laurie Ordoñez MD  Discharge Physician:  Laurie Ordoñze MD     PCP:  Nena Banks    Disposition: Patient discharged from [facility] to home in stable condition.        Primary Diagnosis:   Rectal prolapse          Discharge Medications:     Discharge Medication List as of 11/19/2021  9:33 AM      START taking these medications    Details   oxyCODONE (ROXICODONE) 5 MG tablet Take 1 tablet (5 mg) by mouth every 4 hours as needed for moderate to severe pain, Disp-15 tablet, R-0, E-Prescribe         CONTINUE these medications which have NOT CHANGED    Details   aspirin (ASA) 81 MG EC tablet Take 81 mg by mouth daily as needed , Historical      Biotin 10 MG CAPS Take 10 mg by mouth daily, Historical      Certolizumab Pegol (CIMZIA SC) Inject 200 mg Subcutaneous every 14 days on Tuesdays, Historical      fluticasone (FLONASE) 50 MCG/ACT nasal spray Spray 2 sprays into both nostrils daily as needed for rhinitis or allergies, Historical      folic acid (FOLVITE) 1 MG tablet Take 4 mg by mouth daily (1MG X 4 = 4MG), Historical      gabapentin (NEURONTIN) 300 MG capsule Take 300 mg by mouth daily as needed, Historical      ibuprofen (ADVIL/MOTRIN) 800 MG tablet Take 800 mg by mouth 2 times daily as needed for moderate pain, Historical      levothyroxine (SYNTHROID/LEVOTHROID) 100 MCG tablet Take 100 mcg by mouth daily, Historical      lisinopril (ZESTRIL) 5 MG tablet Take 5 mg by mouth daily , Historical      metFORMIN (GLUCOPHAGE) 500 MG tablet Take 500 mg by mouth 2 times daily (with meals) , Historical      methotrexate 2.5 MG tablet Take 15 mg by mouth once a week on Thursdays  (2.5MG X 6 = 15MG), Historical      omeprazole (PRILOSEC) 20 MG DR capsule Take 20 mg  by mouth daily, Historical      potassium citrate 15 MEQ (1620 MG) TBCR Take 15 mEq by mouth 2 times daily, Historical      predniSONE (DELTASONE) 2.5 MG tablet Take 2.5 mg by mouth daily, Historical      rosuvastatin (CRESTOR) 20 MG tablet Take 20 mg by mouth daily, Historical      UNABLE TO FIND Apply topically three times a week on to scalp  MEDICATION NAME: Compound Medication Cream, Historical      valACYclovir (VALTREX) 1000 mg tablet Take 1,000 mg by mouth daily as needed (COLD SORES), Historical      venlafaxine (EFFEXOR) 37.5 MG tablet Take 37.5 mg by mouth daily , Historical      verapamil (CALAN) 40 MG tablet Take 40 mg by mouth 2 times daily, Historical      vitamin D3 (CHOLECALCIFEROL) 50 mcg (2000 units) tablet Take 1 tablet by mouth daily, Historical                    Follow Up, Special Instructions:     Discharge diet: Low residue   Discharge activity: Lifting restricted to 10 pounds   Discharge follow-up: Follow up with Dr. Ordoñez in 3-4 weeks   Wound care: May get incision wet in shower but do not soak or scrub              Procedures:     Procedure(s): Robotic suture rectopexy       No other procedures performed during this admission            Consultations:   hospitalist          Brief Hospital Summary:     Patient is a 70 year old female who underwent robotic suture rectopexy on 11/17/21 by Dr. Ordoñez.   There were no immediate complications during this procedure.    Please refer to the full operative summary for details.  The patient's hospital course was complicated by an RRT on POD#0 shortly after arriving to the floor. RRT called for tachycardia and chest pain, cardiac workup negative, CT PE negative.  Pain was controlled on oral pain regimen.  She was tolerating a low fiber diet.  Bowel function had returned prior to discharge.  She otherwise recovered as anticipated and experienced no post-operative complications.         Attestation:  I have reviewed today's vital signs, notes,  medications, labs and imaging.    Ida Calabrese (Hermes), PASONDRA  Colorectal Physician Assistant    [facility]  [address] [address]  T: [phone]  F: [phone]            ADDENDUM:  Length of stay: 2 days  Indicate Y or N for the following:  UTI  No  C diff  No  PNA  No  SSI No  DVT No  PE  No  CVA No  MI No  Enterocutaneous fistula  No  Peripheral nerve injury  No  Abscess (not adjacent to anastomosis)  No  Leak No    Treated with:   Antibiotics NO   Drain  NO   Reoperation  NO  Death within 30 days No  Reintubation  No  Reoperation  No   Procedure n/a

## 2021-11-19 NOTE — PROGRESS NOTES
VSS. A/O. Ind. Incisions CDI. Tolerating diet. Denies pain. Voiding fine. d'c home. Pt refused oxy.

## 2021-11-19 NOTE — PROGRESS NOTES
COLON & RECTAL SURGERY  PROGRESS NOTE    November 19, 2021  Post-op Day #2 robotic suture rectopexy    SUBJECTIVE: pain controlled with Tylenol and Ibuprofen. Tolerating LFD. No n/v. Ambulating. Voiding.     OBJECTIVE:  Temp:  [97.1  F (36.2  C)-98.7  F (37.1  C)] 97.1  F (36.2  C)  Pulse:  [79-93] 79  Resp:  [16] 16  BP: (107-135)/(48-60) 135/60  SpO2:  [93 %-96 %] 96 %    Intake/Output Summary (Last 24 hours) at 11/19/2021 0840  Last data filed at 11/19/2021 0200  Gross per 24 hour   Intake 756 ml   Output 950 ml   Net -194 ml       GENERAL:  Awake, alert, no acute distress  HEAD: Normocephalic atraumatic  SCLERA: Anicteric  EXTREMITIES: Warm and well perfused  ABDOMEN:  Soft, appropriately tender, non-distended. No guarding, rigidity, or peritoneal signs. Mid upper epigastric pain improving.   INCISION:  C/d/i    LABS:  Lab Results   Component Value Date    WBC 6.9 11/19/2021     Lab Results   Component Value Date    HGB 8.7 11/19/2021     Lab Results   Component Value Date    HCT 28.6 11/19/2021     Lab Results   Component Value Date     11/19/2021     Last Basic Metabolic Panel:  Lab Results   Component Value Date     11/18/2021      Lab Results   Component Value Date    POTASSIUM 3.7 11/18/2021     Lab Results   Component Value Date    CHLORIDE 109 11/18/2021     Lab Results   Component Value Date    KARRIE 8.7 11/18/2021     Lab Results   Component Value Date    CO2 24 11/18/2021     Lab Results   Component Value Date    BUN 12 11/18/2021     Lab Results   Component Value Date    CR 0.91 11/18/2021     Lab Results   Component Value Date    GLC 99 11/19/2021     11/18/2021       ASSESSMENT/PLAN: 71 yo F POD#2 robotic suture rectopexy. AVSS. Pain controlled.     1. Low fiber diet  2. PRN pain meds  3. Discontinue IVF  4. Henriquez out and voiding  5. OOB, ambulate  6. Discussed with hospitalist, okay for discharge today.     For questions/paging, please contact the CRS office at  [phone].    Ida Calabrese (Jaden), PABethanyC  Colorectal Physician Assistant    [facility]  [address] [facility] [address]  [place] [address]  T: [phone]  F: [phone]

## 2021-11-19 NOTE — PROGRESS NOTES
[facility]    Hospitalist Progress Note      Assessment & Plan   Ioana Solomon is a 70 year old female with pmhx DM2, pSVT, HTN, bladder ulcer and stones, recurrent UTIs, rheumatoid arthritis, fecal and urinary incontinence, and rectal prolapse who is admitted under the care of colorectal surgery service for post operative mngt following a robotic suture rectopexy and intraoperative colonoscopy.     Rectal prolapse s/p robotic suture rectopexy and intraoperative colonoscopy (11/17/2021).  Fecal incontinence.  - Post op mngt per CRS.  - Ancef / Flagyl periop abx.  - PRNs APAP, oxycodone, dilaudid.  - PCDs.  - Resume ASA when ok with CRS.     Atypical chest pain/epigastric pain  Sharp, epigastric. Palpation exacerbates epigastric discomfort. EKG sinus with non-specific T wave change. Has remote hx DVT while on OCPs. Cor angio July 2021 showed mild (20%) disease in LAD.  Troponin negative x2.   Overall suspect pain may be from gas insufflation. Improved today with addition of ibuprofen, .   - RRT called upon arrival to floor; CT PE study obtained and negative   --already on PPI  --LFT, lipase stable  --advised to be re evaluated if symptoms worsening     Rheumatoid arthritis.  Weaned off prednisone October.  Usually on methotrexate but this has been held for several weeks due to recurrent UTI.  - Cont hold mtx.  - Next dose of Cimzia due Tuesday 11/23.  - Continue PTA gabapentin as needed.  - Continue PTA folic acid.  - Hold PTA ibuprofen per surgical service.     Bladder ulcer and stones.  Recurrent UTI.  Hx urinary retention requiring intermittent straight cath.  Completed course of Bactrim 11/14. Urine ctx >100K Klebsiella oxytoca sensitive to Bactrim (various other resistances). UA 11/15 showing 20-50 rbcs.  Retention seems to be slowly improving and near baseline.   - Bladder scan and straight cath prn     HTN / HLD.  1v-CAD. Mild (20%) on cor angio 7/2021.  Hx pSVT.  -  Resume ASA per CRS.  - continue lisinopril and verapamil with hold parameters.  - PTA rosuvastatin.     Type 2 diabetes. A1C 6.3 (11/4/2021).  - sliding scale insulin while in the hospital, resume PTA metformin at discharge     Hypothyroidism. continue Levothyroxine.     Depression / anxiety. conitnue Venlafaxine.     Mild intermittent asthma.  Paroxsymal vocal cord dysfunction.  - PRN albuterol nebs avail.     Preoperative COVID-19. Negative 11/14/2021.     DVT Prophylaxis: Defer to primary service  Code Status: Full Code    Disposition: Expected discharge today. Ok with medicine. Discussed with CRS.     Patient was discussed with Dr. Vargas who agrees with the above plan     Brianna Rock PA-C    Interval History   Pain control improved today with ibuprofen.  with palpation and worsened with eating though she does better if she eats slowly. Some bloating. Passing gas. No nausea. Tolerating po and moving well. Urinary retention near baseline.     -Data reviewed today: I reviewed all new labs and imaging results over the last 24 hours. I personally reviewed no images or EKG's today.    Physical Exam   Temp: 97.1  F (36.2  C) Temp src: Oral BP: 135/60 Pulse: 79   Resp: 16 SpO2: 96 % O2 Device: None (Room air)    Vitals:    11/17/21 0641   Weight: 61.7 kg (136 lb)     Vital Signs with Ranges  Temp:  [97.1  F (36.2  C)-98.7  F (37.1  C)] 97.1  F (36.2  C)  Pulse:  [79-93] 79  Resp:  [16] 16  BP: (107-135)/(48-60) 135/60  SpO2:  [93 %-96 %] 96 %  I/O last 3 completed shifts:  In: 756 [P.O.:420; I.V.:336]  Out: 950 [Urine:950]    Constitutional: Alert and oriented, sitting up in bed. Appears comfortable and is appropriately conversant   ENT: moist mucous membranes  Eyes;  Sclera anicteric, EOMI  Respiratory: Lungs clear to auscultation bilaterally, no increased work of breathing  Cardiovascular: Regular rate and rhythm  GI: positive bowel sounds. Abdomen is soft, appropriately tender though more tender  epigastric area. No guarding or rigidity. Incisions c/d/i  MSK:  Moves all four extremities. Normal tone   Neuro:  Speech is clear. Face symmetric. Follows commands.     Medications     lactated ringers Stopped (11/18/21 1109)       acetaminophen  975 mg Oral Q8H     Biotin  10 mg Oral Daily     folic acid  4 mg Oral Daily     insulin aspart  1-7 Units Subcutaneous TID AC     insulin aspart  1-5 Units Subcutaneous At Bedtime     levothyroxine  100 mcg Oral Daily     lisinopril  5 mg Oral Daily     omeprazole  20 mg Oral Daily     rosuvastatin  20 mg Oral Daily     sodium chloride (PF)  3 mL Intracatheter Q8H     venlafaxine  37.5 mg Oral Daily     verapamil  40 mg Oral BID       Data   Recent Labs   Lab 11/19/21  0711 11/18/21  2151 11/18/21  1803 11/18/21  0834 11/18/21  0739 11/17/21  1810 11/17/21  1533 11/17/21  0735 11/17/21  0627   WBC  --   --   --   --  8.6  --  10.0  --   --    HGB  --   --   --   --  8.7*  --  9.3*  --   --    MCV  --   --   --   --  85  --  84  --   --    PLT  --   --   --   --  242  --  249  --   --    NA  --   --   --   --  139  --  139  --  139   POTASSIUM  --   --   --   --  3.7  --  3.9  --  3.4  3.4   CHLORIDE  --   --   --   --  109  --  108  --  108   CO2  --   --   --   --  24  --  17*  --  18*   BUN  --   --   --   --  12  --  15  --  16   CR  --   --   --   --  0.91  --  0.86  --  0.99  1.02   ANIONGAP  --   --   --   --  6  --  14  --  13   KARRIE  --   --   --   --  8.7  --  8.3*  --  8.8   GLC 99 139* 124*   < > 100*   < > 141*   < > 110*   ALBUMIN  --   --   --   --  2.9*  --  3.1*  --   --    PROTTOTAL  --   --   --   --  6.0*  --  6.0*  --   --    BILITOTAL  --   --   --   --  0.4  --  0.3  --   --    ALKPHOS  --   --   --   --  70  --  78  --   --    ALT  --   --   --   --  26  --  27  --   --    AST  --   --   --   --  21  --  24  --   --    LIPASE  --   --   --   --  116  --   --   --  249   TROPONIN  --   --   --   --  <0.015  --  <0.015  --  <0.015    < > = values in  this interval not displayed.       No results found for this or any previous visit (from the past 24 hour(s)).

## 2021-11-20 DIAGNOSIS — Z71.89 OTHER SPECIFIED COUNSELING: ICD-10-CM

## 2021-11-21 ENCOUNTER — PATIENT OUTREACH (OUTPATIENT)
Dept: CARE COORDINATION | Facility: CLINIC | Age: 71
End: 2021-11-21
Payer: MEDICARE

## 2021-11-21 NOTE — PROGRESS NOTES
Clinic Care Coordination Contact  [facility]: Post-Discharge Note  SITUATION                                                      Admission:    Admission Date: 11/17/21   Reason for Admission: Rectal prolapse  Discharge:   Discharge Date: 11/19/21  Discharge Diagnosis: Rectal prolapse    BACKGROUND                                                      70 year old female who underwent robotic suture rectopexy on 11/17/21 by Dr. Ordoñez.   There were no immediate complications during this procedure.    Please refer to the full operative summary for details.  The patient's hospital course was complicated by an RRT on POD#0 shortly after arriving to the floor. RRT called for tachycardia and chest pain, cardiac workup negative, CT PE negative.  Pain was controlled on oral pain regimen.  She was tolerating a low fiber diet.  Bowel function had returned prior to discharge.  She otherwise recovered as anticipated and experienced no post-operative complications.       ASSESSMENT      Enrollment  Primary Care Care Coordination Status: Not a Candidate    Discharge Assessment  How are you doing now that you are home?: pretty good  How are your symptoms? (Red Flag symptoms escalate to triage hotline per guidelines): Improved  Do you feel your condition is stable enough to be safe at home until your provider visit?: Yes  Does the patient have their discharge instructions? : Yes  Does the patient have questions regarding their discharge instructions? : No  Were you started on any new medications or were there changes to any of your previous medications? : Yes (no questions)  Does the patient have all of their medications?: Yes  Do you have questions regarding any of your medications? : No  Do you have all of your needed medical supplies or equipment (DME)?  (i.e. oxygen tank, CPAP, cane, etc.): Yes  Discharge follow-up appointment scheduled within 14 calendar days? : Yes  Discharge Follow Up Appointment Date:  12/13/21  Discharge Follow Up Appointment Scheduled with?: Specialty Care Provider (surgeon)    Post-op ([facility] Only)  If the patient had a surgery or procedure, do they have any questions for a nurse?: No            PLAN                                                      Outpatient Plan:  Follow up with Dr. Ordoñez in 3-4 weeks    No future appointments.      For any urgent concerns, please contact our 24 hour nurse triage line: [phone] ([phone])         Suzanne Greene MA

## 2021-12-13 ENCOUNTER — LAB REQUISITION (OUTPATIENT)
Dept: LAB | Facility: CLINIC | Age: 71
End: 2021-12-13
Payer: MEDICARE

## 2021-12-13 DIAGNOSIS — K62.6 ULCER OF ANUS AND RECTUM: ICD-10-CM

## 2021-12-13 PROCEDURE — 87529 HSV DNA AMP PROBE: CPT | Mod: ORL,59 | Performed by: COLON & RECTAL SURGERY

## 2021-12-14 LAB
HSV1 DNA SPEC QL NAA+PROBE: NOT DETECTED
HSV2 DNA SPEC QL NAA+PROBE: NOT DETECTED

## 2022-07-26 NOTE — PLAN OF CARE
A&Ox4. VSS on RA. CAPNO WDL Pt. C/o chest pain after arriving to surgery unit. RRT was called, ECG done. ECG found no significant findings. CT w/ contrast to r/o PE (results pending) Labs drawn- WDL ex Hgb slightly low. BS hypoactive, negative flatus. h/o urine retention- straight cathed for 500ml (pt care order to cath PRN) 5 port sites on abdomen, liq bandage. Ice and ABD for comfort. Up with assist x1. Toleratibg clear liquids, denies pain.    A-T Advancement Flap Text: The defect edges were debeveled with a #15 scalpel blade.  Given the location of the defect, shape of the defect and the proximity to free margins an A-T advancement flap was deemed most appropriate.  Using a sterile surgical marker, an appropriate advancement flap was drawn incorporating the defect and placing the expected incisions within the relaxed skin tension lines where possible.    The area thus outlined was incised deep to adipose tissue with a #15 scalpel blade.  The skin margins were undermined to an appropriate distance in all directions utilizing iris scissors.

## (undated) DEVICE — DAVINCI XI GRASPER FENESTRATED TIP UP 8MM 470347

## (undated) DEVICE — ESU GROUND PAD UNIVERSAL W/O CORD

## (undated) DEVICE — ESU ENDO SCISSORS 5MM CVD 5DCS

## (undated) DEVICE — GLOVE PROTEXIS W/NEU-THERA 6.0  2D73TE60

## (undated) DEVICE — DAVINCI XI SEAL UNIVERSAL 5-8MM 470361

## (undated) DEVICE — GLOVE PROTEXIS W/NEU-THERA 7.0  2D73TE70

## (undated) DEVICE — DAVINCI XI NDL DRIVER MEGA SUTURE CUT 8MM 470309

## (undated) DEVICE — DAVINCI HOT SHEARS TIP COVER  400180

## (undated) DEVICE — SU VICRYL 2-0 SH 27" J317H

## (undated) DEVICE — SU ETHIBOND 2-0 SHDA 30" X563H

## (undated) DEVICE — JELLY LUBRICATING SURGILUBE 4OZ TUBE

## (undated) DEVICE — ADH SKIN CLOSURE PREMIERPRO EXOFIN MICOR HV 0.5ML 3471

## (undated) DEVICE — SUCTION CANISTER MEDIVAC LINER 3000ML W/LID 65651-530

## (undated) DEVICE — GOWN LG DISP 9515

## (undated) DEVICE — SU MONOCRYL 4-0 PS-2 18" UND Y496G

## (undated) DEVICE — BAG CLEAR TRASH 1.3M 39X33" P4040C

## (undated) DEVICE — SUCTION IRR STRYKERFLOW II W/TIP 250-070-520

## (undated) DEVICE — CATH TRAY FOLEY SURESTEP 16FR WDRAIN BAG STLK LATEX A300316A

## (undated) DEVICE — SU ETHIBOND 0 CT-2 30" X412H

## (undated) DEVICE — PREP POVIDONE IODINE SOLUTION 10% 120ML

## (undated) DEVICE — PACK DAVINCI GYN SMA15GDFS1

## (undated) DEVICE — DRAPE LAP W/ARMBOARD 29410

## (undated) DEVICE — SOL WATER IRRIG 1000ML BOTTLE 2F7114

## (undated) DEVICE — GLOVE PROTEXIS BLUE W/NEU-THERA 6.5  2D73EB65

## (undated) DEVICE — PAD CHUX UNDERPAD 30X30"

## (undated) DEVICE — DAVINCI XI DRAPE ARM 470015

## (undated) DEVICE — BAG RED BIOHAZARD

## (undated) DEVICE — SOL NACL 0.9% IRRIG 1000ML BOTTLE 2F7124

## (undated) DEVICE — LINEN FULL SHEET 5511

## (undated) DEVICE — LINEN TOWEL PACK X5 5464

## (undated) DEVICE — SU VICRYL 3-0 SH 27" J316H

## (undated) DEVICE — DAVINCI XI OBTURATOR BLADELESS 8MM 470359

## (undated) DEVICE — Device

## (undated) DEVICE — SU PROLENE 2-0 SHDA 48" 8533H

## (undated) DEVICE — SUCTION CANISTER STRAW 65652-008

## (undated) DEVICE — DAVINCI XI DRAPE COLUMN 470341

## (undated) DEVICE — NDL INSUFFLATION 13GA 120MM C2201

## (undated) DEVICE — BASIN SET MINOR DISP

## (undated) DEVICE — KIT PATIENT POSITIONING PIGAZZI LATEX FREE 40580

## (undated) DEVICE — GLOVE PROTEXIS MICRO 6.0  2D73PM60

## (undated) DEVICE — LINEN HALF SHEET 5512

## (undated) DEVICE — DRAPE IOBAN INCISE 23X17" 6650EZ

## (undated) DEVICE — SU VICRYL 3-0 RB-1 27" J305H

## (undated) DEVICE — TUBING CONMED AIRSEAL SMOKE EVAC INSUFFLATION ASM-EVAC

## (undated) RX ORDER — FENTANYL CITRATE 50 UG/ML
INJECTION, SOLUTION INTRAMUSCULAR; INTRAVENOUS
Status: DISPENSED
Start: 2021-11-17

## (undated) RX ORDER — ONDANSETRON 2 MG/ML
INJECTION INTRAMUSCULAR; INTRAVENOUS
Status: DISPENSED
Start: 2021-11-17

## (undated) RX ORDER — APREPITANT 40 MG/1
CAPSULE ORAL
Status: DISPENSED
Start: 2021-11-17

## (undated) RX ORDER — ACETAMINOPHEN 325 MG/1
TABLET ORAL
Status: DISPENSED
Start: 2021-11-17

## (undated) RX ORDER — DEXAMETHASONE SODIUM PHOSPHATE 4 MG/ML
INJECTION, SOLUTION INTRA-ARTICULAR; INTRALESIONAL; INTRAMUSCULAR; INTRAVENOUS; SOFT TISSUE
Status: DISPENSED
Start: 2021-11-17

## (undated) RX ORDER — LIDOCAINE HYDROCHLORIDE 20 MG/ML
INJECTION, SOLUTION EPIDURAL; INFILTRATION; INTRACAUDAL; PERINEURAL
Status: DISPENSED
Start: 2021-11-17

## (undated) RX ORDER — FENTANYL CITRATE 50 UG/ML
INJECTION, SOLUTION INTRAMUSCULAR; INTRAVENOUS
Status: DISPENSED
Start: 2021-09-17

## (undated) RX ORDER — CIPROFLOXACIN 2 MG/ML
INJECTION, SOLUTION INTRAVENOUS
Status: DISPENSED
Start: 2021-11-17

## (undated) RX ORDER — CEFAZOLIN SODIUM 2 G/100ML
INJECTION, SOLUTION INTRAVENOUS
Status: DISPENSED
Start: 2021-11-17

## (undated) RX ORDER — PROPOFOL 10 MG/ML
INJECTION, EMULSION INTRAVENOUS
Status: DISPENSED
Start: 2021-11-17

## (undated) RX ORDER — BUPIVACAINE HYDROCHLORIDE AND EPINEPHRINE 5; 5 MG/ML; UG/ML
INJECTION, SOLUTION EPIDURAL; INTRACAUDAL; PERINEURAL
Status: DISPENSED
Start: 2021-11-17

## (undated) RX ORDER — HYDROMORPHONE HYDROCHLORIDE 1 MG/ML
INJECTION, SOLUTION INTRAMUSCULAR; INTRAVENOUS; SUBCUTANEOUS
Status: DISPENSED
Start: 2021-11-17

## (undated) RX ORDER — CEFAZOLIN SODIUM 1 G/3ML
INJECTION, POWDER, FOR SOLUTION INTRAMUSCULAR; INTRAVENOUS
Status: DISPENSED
Start: 2021-11-17

## (undated) RX ORDER — DIPHENHYDRAMINE HYDROCHLORIDE 50 MG/ML
INJECTION INTRAMUSCULAR; INTRAVENOUS
Status: DISPENSED
Start: 2021-11-17